# Patient Record
Sex: MALE | Race: OTHER | HISPANIC OR LATINO
[De-identification: names, ages, dates, MRNs, and addresses within clinical notes are randomized per-mention and may not be internally consistent; named-entity substitution may affect disease eponyms.]

---

## 2017-06-22 PROBLEM — Z00.00 ENCOUNTER FOR PREVENTIVE HEALTH EXAMINATION: Status: ACTIVE | Noted: 2017-06-22

## 2017-07-24 ENCOUNTER — APPOINTMENT (OUTPATIENT)
Dept: UROLOGY | Facility: CLINIC | Age: 77
End: 2017-07-24

## 2017-07-24 VITALS
BODY MASS INDEX: 26.98 KG/M2 | SYSTOLIC BLOOD PRESSURE: 162 MMHG | WEIGHT: 178 LBS | DIASTOLIC BLOOD PRESSURE: 70 MMHG | HEART RATE: 60 BPM | HEIGHT: 68 IN | RESPIRATION RATE: 16 BRPM

## 2017-07-24 DIAGNOSIS — Z86.69 PERSONAL HISTORY OF OTHER DISEASES OF THE NERVOUS SYSTEM AND SENSE ORGANS: ICD-10-CM

## 2017-07-24 DIAGNOSIS — Z86.79 PERSONAL HISTORY OF OTHER DISEASES OF THE CIRCULATORY SYSTEM: ICD-10-CM

## 2017-07-24 DIAGNOSIS — R10.2 PELVIC AND PERINEAL PAIN: ICD-10-CM

## 2017-07-24 DIAGNOSIS — N99.114 POSTPROCEDURAL URETHRAL STRICTURE, MALE, UNSPECIFIED: ICD-10-CM

## 2017-07-26 LAB
APPEARANCE: CLEAR
BACTERIA: NEGATIVE
BILIRUBIN URINE: NEGATIVE
BLOOD URINE: NEGATIVE
COLOR: YELLOW
GLUCOSE QUALITATIVE U: NORMAL MG/DL
KETONES URINE: NEGATIVE
LEUKOCYTE ESTERASE URINE: NEGATIVE
MICROSCOPIC-UA: NORMAL
NITRITE URINE: NEGATIVE
PH URINE: 5.5
PROTEIN URINE: NEGATIVE MG/DL
RED BLOOD CELLS URINE: 2 /HPF
SPECIFIC GRAVITY URINE: 1.01
SQUAMOUS EPITHELIAL CELLS: 0 /HPF
UROBILINOGEN URINE: NORMAL MG/DL
WHITE BLOOD CELLS URINE: 0 /HPF

## 2017-10-25 ENCOUNTER — APPOINTMENT (OUTPATIENT)
Dept: UROLOGY | Facility: CLINIC | Age: 77
End: 2017-10-25
Payer: MEDICARE

## 2017-10-25 DIAGNOSIS — C61 MALIGNANT NEOPLASM OF PROSTATE: ICD-10-CM

## 2017-10-25 PROCEDURE — 99214 OFFICE O/P EST MOD 30 MIN: CPT

## 2017-10-25 RX ORDER — GABAPENTIN 300 MG/1
300 CAPSULE ORAL
Qty: 120 | Refills: 2 | Status: ACTIVE | COMMUNITY
Start: 2017-10-25 | End: 1900-01-01

## 2017-10-25 RX ORDER — NORTRIPTYLINE HYDROCHLORIDE 10 MG/1
10 CAPSULE ORAL
Qty: 90 | Refills: 4 | Status: DISCONTINUED | COMMUNITY
Start: 2017-07-24 | End: 2017-10-25

## 2017-12-01 ENCOUNTER — APPOINTMENT (OUTPATIENT)
Dept: UROLOGY | Facility: CLINIC | Age: 77
End: 2017-12-01

## 2018-05-01 ENCOUNTER — TRANSCRIPTION ENCOUNTER (OUTPATIENT)
Age: 78
End: 2018-05-01

## 2022-04-08 ENCOUNTER — APPOINTMENT (OUTPATIENT)
Dept: ORTHOPEDIC SURGERY | Facility: CLINIC | Age: 82
End: 2022-04-08
Payer: MEDICARE

## 2022-04-08 VITALS — BODY MASS INDEX: 28.93 KG/M2 | HEIGHT: 66 IN | WEIGHT: 180 LBS

## 2022-04-08 DIAGNOSIS — Z78.9 OTHER SPECIFIED HEALTH STATUS: ICD-10-CM

## 2022-04-08 DIAGNOSIS — Z86.79 PERSONAL HISTORY OF OTHER DISEASES OF THE CIRCULATORY SYSTEM: ICD-10-CM

## 2022-04-08 DIAGNOSIS — Z87.891 PERSONAL HISTORY OF NICOTINE DEPENDENCE: ICD-10-CM

## 2022-04-08 PROBLEM — Z00.00 ENCOUNTER FOR PREVENTIVE HEALTH EXAMINATION: Noted: 2022-04-08

## 2022-04-08 PROCEDURE — 99214 OFFICE O/P EST MOD 30 MIN: CPT

## 2022-04-11 RX ORDER — ATORVASTATIN CALCIUM 10 MG/1
10 TABLET, FILM COATED ORAL
Qty: 30 | Refills: 0 | Status: ACTIVE | COMMUNITY
Start: 2022-03-04

## 2022-04-11 RX ORDER — HYDRALAZINE HYDROCHLORIDE 25 MG/1
25 TABLET ORAL
Qty: 90 | Refills: 0 | Status: ACTIVE | COMMUNITY
Start: 2022-03-29

## 2022-04-11 RX ORDER — ENALAPRIL MALEATE 20 MG/1
20 TABLET ORAL
Qty: 180 | Refills: 0 | Status: ACTIVE | COMMUNITY
Start: 2021-09-20

## 2022-04-11 RX ORDER — METOPROLOL SUCCINATE 50 MG/1
50 TABLET, EXTENDED RELEASE ORAL
Qty: 135 | Refills: 0 | Status: ACTIVE | COMMUNITY
Start: 2022-01-06

## 2022-04-11 RX ORDER — HYDRALAZINE HYDROCHLORIDE 10 MG/1
10 TABLET ORAL
Qty: 60 | Refills: 0 | Status: ACTIVE | COMMUNITY
Start: 2021-06-28

## 2022-04-11 RX ORDER — CHLORHEXIDINE GLUCONATE, 0.12% ORAL RINSE 1.2 MG/ML
0.12 SOLUTION DENTAL
Qty: 473 | Refills: 0 | Status: ACTIVE | COMMUNITY
Start: 2021-11-22

## 2022-04-11 RX ORDER — LATANOPROST/PF 0.005 %
0.01 DROPS OPHTHALMIC (EYE)
Qty: 2 | Refills: 0 | Status: ACTIVE | COMMUNITY
Start: 2022-03-17

## 2022-04-11 RX ORDER — HYDROCHLOROTHIAZIDE 12.5 MG/1
12.5 TABLET ORAL
Qty: 30 | Refills: 0 | Status: ACTIVE | COMMUNITY
Start: 2022-01-11

## 2022-04-11 NOTE — DISCUSSION/SUMMARY
[de-identified] : 82 y/o male with lumbar degenerative disc disease. Patient will continue lumbar home exercise program. I am advised the patient that if his pain doesn't improve or worsens with conservative care, than we can consider referring him to pain management for consideration of MBB's. I would like to follow up with the patient in 6 weeks to assess his response to conservative care. Will obtain MRI of lumbar spine to evaluate for nerve compression as etiology of symptoms. Discussed with patient that if refractory to conservative treatment decompressive surgery could be considered however will reserve that decision for after review of mri.

## 2022-04-11 NOTE — DATA REVIEWED
[Outside X-rays] : outside x-rays [Lumbar Spine] : lumbar spine [I independently reviewed and interpreted images and report] : I independently reviewed and interpreted images and report [I reviewed the films/CD and additionally noted] : I reviewed the films/CD and additionally noted [FreeTextEntry1] : I stop paperwork reviewed\par Urology progress notes reviewed

## 2022-04-11 NOTE — HISTORY OF PRESENT ILLNESS
[7] : 7 [Dull/Aching] : dull/aching [Constant] : constant [Sleep] : sleep [Retired] : Work status: retired [de-identified] : 82 y/o male presents for repeat evaluation of lower back. Patient reports a longstanding history of lower back pain. Patient reports that extension and twisting motions, such as playing golf exacerbates his pain. Patient has tried Lidocaine patches and Aleve without any relief. Patient states the pain is now in the groin and testes. [] : no [FreeTextEntry1] : L-spine  [FreeTextEntry7] : left hip, groin  [FreeTextEntry8] : lisbeth  [FreeTextEntry9] : Aleve  [de-identified] : PT, At home exrcises , Gym

## 2022-04-15 ENCOUNTER — RESULT REVIEW (OUTPATIENT)
Age: 82
End: 2022-04-15

## 2022-04-20 ENCOUNTER — APPOINTMENT (OUTPATIENT)
Dept: ORTHOPEDIC SURGERY | Facility: CLINIC | Age: 82
End: 2022-04-20
Payer: MEDICARE

## 2022-04-20 VITALS — WEIGHT: 180 LBS | BODY MASS INDEX: 28.93 KG/M2 | HEIGHT: 66 IN

## 2022-04-20 PROCEDURE — 99214 OFFICE O/P EST MOD 30 MIN: CPT

## 2022-04-20 NOTE — PHYSICAL EXAM
[de-identified] : Constitutional:\par -  General Appearance: \par Unremarkable\par Body Habitus\par Well Developed \par Well Nourished\par Body Habitus\par No Deformities\par Well Groomed\par \par Ability To communicate:\par Normal\par \par Neurologic: \par Global sensation is intact to upper and lower extremities.  See examination of Neck and/or Spine for exceptions.\par Orientation to Time, Place and Person is: Normal\par Mood And Affect is Normal\par \par Skin:\par -  Head/Face, Right Upper/Lower Extremity, Left Upper/Lower Extremity: Normal\par See Examination of Neck and/or Spine for exceptions\par \par Cardiovascular:\par Peripheral Cardiovascular System is Normal\par \par Palpation of Lymph Nodes:\par Normal Palpation of lymph nodes in: Axilla, Cervical, Inguinal\par Abnormal Palpation of lymph nodes in: None [de-identified] : Neurological Testing for the Lumbar spine is as follows:\par - Light Touch is intact throughout both Lower extremities\par - Achilles and patella reflexes are symmetrical\par - No sustained clonus at ankles\par - Negative Babinski test bilaterally\par - Sensory exam is non-focal throughout both lower extremities [] : non-antalgic

## 2022-04-20 NOTE — REVIEW OF SYSTEMS
[Joint Pain] : joint pain [Joint Stiffness] : joint stiffness [Negative] : Heme/Lymph [FreeTextEntry9] : L-SPINE

## 2022-04-20 NOTE — ASSESSMENT
[FreeTextEntry1] : 82 y/o male with lumbar facet arthropathy L4-S1 with correlative exam findings. I am referring the patient to pain management to discuss trying facet directed intervention - MBB/RFA. I would like to follow up with the patient on an as needed basis moving forward. \par \par Prior to appointment and during encounter with patient extensive medical records were reviewed including but not limited to, hospital records, out patient records, imaging results, and lab data. During this appointment the patient was examined, diagnoses were discussed and explained in a face to face manner. In addition extensive time was spent reviewing aforementioned diagnostic studies. Counseling including abnormal image results, differential diagnoses, treatment options, risk and benefits, lifestyle changes, current condition, and current medications was performed. Patient's comments, questions, and concerns were address and patient verbalized understanding. Based on this patient's presentation at our office, which is an orthopedic spine surgeon's office, this patient inherently / intrinsically has a risk, however minute, of developing issues such as Cauda equina syndrome, bowel and bladder changes, or progression of motor or neurological deficits such as paralysis which may be permanent.\par \par I, Chauncey Cesar, attest that this documentation has been prepared under the direction and in the presence of provider Star Jackson MD.

## 2022-04-20 NOTE — DATA REVIEWED
[MRI] : MRI [Lumbar Spine] : lumbar spine [I independently reviewed and interpreted images and report] : I independently reviewed and interpreted images and report [FreeTextEntry1] : Lumbar spine MRI taken (04/15/22) - [facility]\par On my interpretation of the images\par L5-S1 moderate facet arthritis bilaterally, broad based disc protrusion resulting in a moderate bilateral foraminal stenosis\par L4-5 disc degeneration, moderate, large anterior osteophyte, Bright signal but no erosive endplate changes \par L3-4 anterior osteophyte formation, moderate disc degeneration, mild to moderate facet degeneration, L3 anterior hemangioma  \par L2-3 mild to moderate disc degeneration, large anterior osteophyte, mild facet degeneration\par L1-2 mdoerate disc degeneration, large anterior osteophyte, no stenosis

## 2022-04-20 NOTE — HISTORY OF PRESENT ILLNESS
[6] : 6 [4] : 4 [Dull/Aching] : dull/aching [Radiating] : radiating [Tightness] : tightness [Squeezing] : squeezing [Constant] : constant [Sleep] : sleep [Retired] : Work status: retired [] : no [FreeTextEntry1] : L-spine  [FreeTextEntry5] : PATIENT IS HERE TODAY TO REVIEW MRI RESULTS - L-SPINE [FreeTextEntry7] : left hip, groin  [FreeTextEntry8] : lisbeth  [FreeTextEntry9] : Aleve; TYLENOL EXTRA STRENGTH [de-identified] : SWINGING GOLF CLUBS; CERTAI MOVEMENTS [de-identified] : 4/8/22 [de-identified] : DR BEE [de-identified] : 4/8/22 [de-identified] : MRI - 4/15/22 [de-identified] : PT, At home exErcises , Gym  [de-identified] : 80 y/o male presents for repeat evaluation of lower back. Patient reports a longstanding history of lower back pain. Patient reports that extension and twisting motions, such as playing golf exacerbates his pain. Patient has tried Lidocaine patches and Aleve without any relief. Patient states the pain is now in the groin and testes.

## 2022-05-12 ENCOUNTER — APPOINTMENT (OUTPATIENT)
Dept: PAIN MANAGEMENT | Facility: CLINIC | Age: 82
End: 2022-05-12

## 2022-05-24 ENCOUNTER — APPOINTMENT (OUTPATIENT)
Dept: PAIN MANAGEMENT | Facility: CLINIC | Age: 82
End: 2022-05-24
Payer: MEDICARE

## 2022-05-24 VITALS — BODY MASS INDEX: 28.93 KG/M2 | WEIGHT: 180 LBS | HEIGHT: 66 IN

## 2022-05-24 PROCEDURE — 99204 OFFICE O/P NEW MOD 45 MIN: CPT

## 2022-05-25 NOTE — HISTORY OF PRESENT ILLNESS
[Lower back] : lower back [6] : 6 [Dull/Aching] : dull/aching [Localized] : localized [Leisure] : leisure [Social interactions] : social interactions [Rest] : rest [Meds] : meds [FreeTextEntry1] : The patient presents for initial evaluation regarding their low back pain. Patient was referred by Dr. Jackson. Patient c/o low back pain radiating to the left lower extremity. Patient reports back > leg pain. Long standing history of back pain but recent worsening within the past 7-8 months. Patient reports he completed extensive PT with minimal to no benefit. Patient reports benefit with OTC Tylenol or Aleve. Patient reports he is unable to play golf and other daily activities for prolonged period of time due to severity of pain. He reports increase of pain in the morning but subsides somewhat throughout the day. Pain is dependant on activity. Patient denies weakness, paraesthesias and b/b dysfunction. \par  \par Subjective weakness: No \par Lower extremity paresthesias: No \par Bladder/bowel dysfunction: No \par  \par Injections: No \par  \par Pertinent Surgical History: N/A \par  \par Imaging: \par MRI Lumbar Spine (04/15/22) -  [facility]\par  \par T12-L1: There is no disc herniation, significant central canal or right foraminal stenosis. There is left facet arthropathy minimally encroach upon the left neural foramen.\par L1-2: There is no disc herniation, significant central canal or neural foraminal stenosis.\par L2-3: There is disc desiccation and disc bulge with slight retrolisthesis. There is a small superimposed central disc protrusion impinging upon the thecal sac. There is mild bilateral facet arthropathy and minimal spinal stenosis.\par L3-4: There is disc desiccation and mild disc bulge with broad-based left lateral and foraminal disc herniation impinging upon the left L3 nerve root. There is no significant central canal stenosis.\par L4-5: There is endplate osteophyte and disc bulge encroaching upon the neural foramina and L4 nerve roots bilaterally. There is bilateral facet arthropathy without significant central canal stenosis.\par L5-S1: There is disc desiccation compatible disc degeneration. There is moderate disc bulge, with a small superimposed central disc herniation, and Modic type II endplate change. There is bilateral facet arthropathy contributing to bilateral foraminal encroachment. There is no significant central canal stenosis.\par  \par Physician Disclaimer: I have personally reviewed and confirmed all HPI data with the patient. [] : no [de-identified] : lumbar mri at [facility] dio

## 2022-05-25 NOTE — PHYSICAL EXAM
[de-identified] : Constitutional: \par - No acute distress \par - Well developed; well nourished \par \par Neurological: \par - normal mood and affect \par - alert and oriented x 3  \par \par Cardiovascular: \par - 1+ BLE edema \par \par Lumbar Spine Exam: \par \par Inspection: \par erythema (-) \par ecchymosis (-) \par rashes (-) \par alignment: no scoliosis\par \par Palpation:  \par paraspinal tenderness:                  L (-) ; R (-) \par thoracic paraspinal tenderness:    L (-) ; R (-) \par sciatic nerve tenderness :             L (-) ; R (-) \par SI joint tenderness:                        L (-) ; R (-) \par GTB tenderness:                            L (-);  R (-) \par \par ROM:   \par Full ROM with mild stiffness \par Pain with extension \par \par Strength:                   Right       Left    \par Hip Flexion:                (5/5)       (5/5) \par Quadriceps:               (5/5)       (5/5) \par Hamstrings:                (5/5)       (5/5) \par Ankle Dorsiflexion:     (5/5)       (5/5) \par EHL:                            (5/5)       (5/5) \par Ankle Plantarflexion:  (5/5)       (5/5) \par \par \par Special Tests: \par SLR:                      R (-) ; L (-) \par Facet loading:       R (+) ; L (+) \par SHELTON test:          R (-) ; L (-) \par XSLR:                   R (-) ; L (-) \par Jarrett's test:        R (-) ; L(-) \par Tight Hamstrings   R (-) ; L (-) \par \par Neurologic: \par Light touch intact throughout LE \par Reflexes normal and symmetric \par \par Gait: \par non- antalgic gait \par ambulates without assistive device

## 2022-05-25 NOTE — ASSESSMENT
[FreeTextEntry1] : A discussion regarding available pain management treatment options occurred with the patient.  These included interventional, rehabilitative, pharmacological, and alternative modalities. We will proceed with the following: \par \par Interventional treatment options: \par - Proceed with B/L L4-5, L5-S1 facet joint MBB with fluoroscopic guidance; proceed to RFA if adequate response\par - Patient requires clearance to hold Plavix x 7 days \par - Patient may remain on aspirin for indicated procedure \par - see additional instructions below \par \par Rehabilitative options: \par - completed trial of physical therapy \par - participation in active HEP was encouraged\par \par Medication based treatment options: \par - poor candidate for oral NSAIDs secondary to anti-coagulation \par - continue Tylenol 500-1000mg TID PRN \par - see additional instructions below \par \par Complementary treatment options: \par - Weight management and lifestyle modifications discussed\par \par Additional treatment recommendations as follows: \par - Follow up 1-2 weeks post injection for assessment of efficacy and further recommendations.\par \par Patient presents with axial lumbar pain that has not responded to 3 months of conservative therapy including physical therapy or NSAID therapy.  The pain is interfering with activities of daily living and functionality.  There is no radicular pain.  The pain is exacerbated by facet loading.  Positive Kemps maneuver which is defined by pain reproduction with extension and rotation of the lumbar spine to the affected side.  The patient has not had a vertebral fusion at the levels of the proposed treatment.  There is no unexplained neurologic deficit.  There is no history of systemic infection, unstable medical condition, bleeding tendency, or local infection.  The injection is being performed to diagnose the facet joint as the source of the individual's pain.\par \par The documentation recorded by the scribe, in my presence, accurately reflects the service I personally performed and the decisions made by me with my edits as appropriate.\par \par I, Brenda Bailey acting as scribe, attest that this documentation has been prepared under the direction and in the presence of Provider Kurt Clinton DO.

## 2022-06-08 ENCOUNTER — NON-APPOINTMENT (OUTPATIENT)
Age: 82
End: 2022-06-08

## 2022-06-09 ENCOUNTER — TRANSCRIPTION ENCOUNTER (OUTPATIENT)
Age: 82
End: 2022-06-09

## 2022-06-10 ENCOUNTER — APPOINTMENT (OUTPATIENT)
Dept: DISASTER EMERGENCY | Facility: HOSPITAL | Age: 82
End: 2022-06-10

## 2022-06-10 ENCOUNTER — OUTPATIENT (OUTPATIENT)
Dept: OUTPATIENT SERVICES | Facility: HOSPITAL | Age: 82
LOS: 1 days | End: 2022-06-10

## 2022-06-10 VITALS
DIASTOLIC BLOOD PRESSURE: 69 MMHG | OXYGEN SATURATION: 98 % | SYSTOLIC BLOOD PRESSURE: 190 MMHG | RESPIRATION RATE: 16 BRPM | HEIGHT: 66 IN | TEMPERATURE: 98 F | WEIGHT: 178.57 LBS | HEART RATE: 72 BPM

## 2022-06-10 VITALS
DIASTOLIC BLOOD PRESSURE: 63 MMHG | RESPIRATION RATE: 16 BRPM | HEART RATE: 55 BPM | TEMPERATURE: 99 F | OXYGEN SATURATION: 97 % | SYSTOLIC BLOOD PRESSURE: 143 MMHG

## 2022-06-10 DIAGNOSIS — U07.1 COVID-19: ICD-10-CM

## 2022-06-10 RX ORDER — BEBTELOVIMAB 87.5 MG/ML
175 INJECTION, SOLUTION INTRAVENOUS ONCE
Refills: 0 | Status: COMPLETED | OUTPATIENT
Start: 2022-06-10 | End: 2022-06-10

## 2022-06-10 RX ADMIN — BEBTELOVIMAB 175 MILLIGRAM(S): 87.5 INJECTION, SOLUTION INTRAVENOUS at 11:24

## 2022-06-10 NOTE — MONOCLONAL ANTIBODY INFUSION - EXAM
CC: Monoclonal Antibody Infusion/COVID 19 Positive  81yMale with CAD, HTN, pre-DM    exam/findings:  T(C): 36.7 (06-10-22 @ 11:08), Max: 36.7 (06-10-22 @ 11:08)  HR: 72 (06-10-22 @ 11:08) (72 - 72)  BP: --  RR: 16 (06-10-22 @ 11:08) (16 - 16)  SpO2: 98% (06-10-22 @ 11:08) (98% - 98%)      PE:   Appearance: NAD	  HEENT:   Normal oral mucosa,   Lymphatic: No lymphadenopathy  Cardiovascular: Normal S1 S2, No JVD, No murmurs, No edema  Respiratory: Lungs clear to auscultation	  Gastrointestinal:  Soft, Non-tender, + BS	  Skin: warm and dry  Neurologic: Non-focal  Extremities: Normal range of motion

## 2022-06-10 NOTE — MONOCLONAL ANTIBODY INFUSION - HOME MEDICATIONS
atorvastatin ,  orally   aspirin 81 mg oral tablet , 1 tab(s) orally once a day  Plavix 75 mg oral tablet , 1 tab(s) orally once a day  metoprolol ,  orally   Norvasc 10 mg oral tablet , 1 tab(s) orally once a day  Vasotec 20 mg oral tablet , 1 tab(s) orally once a day

## 2022-06-10 NOTE — MONOCLONAL ANTIBODY INFUSION - ASSESSMENT AND PLAN
ASSESSMENT:  Pt is a 81 years with -Covid + on 6/8, onset on 6/7 referred by Dr. Marshall .who presents to infusion center for Monoclonal antibody infusion (Bebtelovimab). Patient is vaccinated and boosted x 2 with Pfizer  Symptoms/ Criteria: sore throat, fever  Risk Profile includes: CAD, HTN, pre-DM    PLAN:  - infusion procedure explained to patient   - Consent for monoclonal antibody infusion obtained   - Risk & benefits discussed/all questions answered  - Bebtelovimab 175mg IVP over 30 seconds  - observe patient for one hour post infusion and discharge home

## 2022-09-14 ENCOUNTER — APPOINTMENT (OUTPATIENT)
Dept: ORTHOPEDIC SURGERY | Facility: CLINIC | Age: 82
End: 2022-09-14

## 2022-09-14 VITALS — BODY MASS INDEX: 28.93 KG/M2 | WEIGHT: 180 LBS | HEIGHT: 66 IN

## 2022-09-14 PROCEDURE — 20611 DRAIN/INJ JOINT/BURSA W/US: CPT

## 2022-09-14 PROCEDURE — 73564 X-RAY EXAM KNEE 4 OR MORE: CPT | Mod: LT

## 2022-09-14 PROCEDURE — 99214 OFFICE O/P EST MOD 30 MIN: CPT | Mod: 25

## 2022-09-14 NOTE — DISCUSSION/SUMMARY
[de-identified] : RB&A to corticosteroid injection discussed.\par All questions were answered.\par Patient wishes to move forward with injection today. \par Aspirated 20ml clear synovial fluid using ultrasound \par Cortisone Knee Injection - Left\par The risks, benefits, and alternatives to cortisone injection were explained in full to the patient.  Risks outlined include but are not limited to infection, sepsis, bleeding, scarring, skin discoloration, temporary increase in pain, syncopal episode, failure to resolve symptoms, allergic reaction, symptom recurrence, and elevation of blood sugar in diabetics.  Patient understood the risks.  All questions were answered.  After discussion of options, patient requested an injection.  Oral informed consent was obtained and sterile prep was done of the injection site.  A mixture of 40mg of Kenalog, 2cc of 1% Lidocaine was sterilely prepared by me.  Sterile technique was used to introduce the mixture into the left knee. Ultrasound was used for proper needle placement.  Patient tolerated the procedure well.  Advised to ice the injection site this evening. \par \par Patient will follow up in 6 weeks \par \par -----------------------------------------------\par Home Exercise\par The patient is instructed on a home exercise program.\par \par CORTEZ GARCIA Acting as a Scribe for Dr. eHlton\par I, Cortez Garcia, attest that this documentation has been prepared under the direction and in the presence of Provider Phi Helton MD.\par \par Activity Modification\par The patient was advised to modify their activities.\par \par Dx / Natural History\par The patient was advised of the diagnosis.  The natural history of the pathology was explained in full to the patient in layman's terms.  Several different treatment options were discussed and explained in full to the patient including the risks and benefits of both surgical and non-surgical treatments.  All questions and concerns were answered.\par \par Pain Guide Activities\par The patient was advised to let pain guide the gradual advancement of activities.\par \par RICE\par I explained to the patient that rest, ice, compression, and elevation would benefit them.  They may return to activity after follow-up or when they no longer have any pain.

## 2022-09-14 NOTE — HISTORY OF PRESENT ILLNESS
[de-identified] : The patient is a 81 year old L hand dominant male who presents today complaining of L knee pain.  \par Date of Injury/Onset: ~08/2022\par Pain:    At Rest: 6/10 \par With Activity:  6/10 \par Mechanism of injury: Insidious \par This is NOT a Work Related Injury being treated under Worker's Compensation.\par This is NOT an athletic injury occurring associated with an interscholastic or organized sports team.\par Quality of symptoms: Pressure, discomfort\par Improves with: N/A\par Worse with: Golfing \par Prior treatment: Compression, patellar strap, ice\par Prior Imaging: N/A\par Out of work/sport: _, since _\par School/Sport/Position/Occupation: Retired\par Additional Information: PMHx of non-op quadriceps tear on involved side. \par

## 2022-09-14 NOTE — PHYSICAL EXAM
[de-identified] : Neurologic: normal coordination, normal DTR UE/LE , normal sensation, normal mood and affect, orientated and able to communicate. \par Skin: normal skin, no rash, no ulcers and no lesions. \par Lymphatic: no obvious lymphadenopathy in areas examined. \par Constitutional: well developed and well nourished. \par Cardiovascular: peripheral vascular exam is grossly normal. \par Pulmonary: no respiratory distress, lungs clear to auscultation bilaterally. \par Abdomen: normal bowel sounds, non-tender, no HSM and no mass. \par \par Left Knee: Moderate effusion\par Medial joint line tenderness\par Medial facet of patella tenderness\par Positive Elena's test \par Complete tear to lateral aspect of quadriceps tendon noted\par \par X-Ray Left Knee: 4 views: Patellar bone spurs, PF OA

## 2022-11-28 ENCOUNTER — APPOINTMENT (OUTPATIENT)
Dept: OTOLARYNGOLOGY | Facility: CLINIC | Age: 82
End: 2022-11-28

## 2023-08-15 ENCOUNTER — APPOINTMENT (OUTPATIENT)
Dept: ORTHOPEDIC SURGERY | Facility: CLINIC | Age: 83
End: 2023-08-15
Payer: MEDICARE

## 2023-08-15 VITALS — WEIGHT: 180 LBS | BODY MASS INDEX: 28.93 KG/M2 | HEIGHT: 66 IN

## 2023-08-15 PROCEDURE — 99214 OFFICE O/P EST MOD 30 MIN: CPT

## 2023-08-15 NOTE — DISCUSSION/SUMMARY
[de-identified] : Physical therapy prescribed for strengthening and stretching for lumbar spine. Folloe up with spine services.   ----------------------------------------------- Home Exercise The patient is instructed on a home exercise program.  RAMBO DUEÑAS Acting as a Scribe for Dr. Sunitha WORRELL, Rambo Dueñas, attest that this documentation has been prepared under the direction and in the presence of Provider Phi Helton MD.  Activity Modification The patient was advised to modify their activities.  Dx / Natural History The patient was advised of the diagnosis.  The natural history of the pathology was explained in full to the patient in layman's terms.  Several different treatment options were discussed and explained in full to the patient including the risks and benefits of both surgical and non-surgical treatments.  All questions and concerns were answered.  Pain Guide Activities The patient was advised to let pain guide the gradual advancement of activities.  VEL WORRELL explained to the patient that rest, ice, compression, and elevation would benefit them.  They may return to activity after follow-up or when they no longer have any pain.  The patient's current medication management of their orthopedic diagnosis was reviewed today: (1) We discussed a comprehensive treatment plan that included possible pharmaceutical management involving the use of prescription strength medications including but not limited to options such as oral Naprosyn 500mg BID, once daily Meloxicam 15 mg, or 500-650 mg Tylenol versus over the counter oral medications and topical prescription NSAID Pennsaid vs over the counter Voltaren gel. (2) There is a moderate risk of morbidity with further treatment, especially from use of prescription strength medications and possible side effects of these medications which include upset stomach with oral medications, skin reactions to topical medications and cardiac/renal issues with long term use. (3) I recommended that the patient follow-up with their medical physician to discuss any significant specific potential issues with long term medication use such as interactions with current medications or with exacerbation of underlying medical comorbidities. (4) The benefits and risks associated with use of injectable, oral or topical, prescription and over the counter anti-inflammatory medications were discussed with the patient. The patient voiced understanding of the risks including but not limited to bleeding, stroke, kidney dysfunction, heart disease, and were referred to the black box warning label for further information.

## 2023-08-15 NOTE — PHYSICAL EXAM
[de-identified] : Left Knee:  No effusion Medial joint line tenderness Medial facet of patella tenderness Positive Elena's test  Complete tear to lateral aspect of quadriceps tendon noted  Lumbar Spine: sciatic tenderness    X-Ray Left Knee: 4 views: Patellar bone spurs, PF OA

## 2023-08-15 NOTE — HISTORY OF PRESENT ILLNESS
[de-identified] : The patient is a 82 year old [LEFT] hand dominant male who presents today complaining of left knee.   Date of Injury/Onset: 8/2022 Pain:    At Rest: 7/10  With Activity:  7/10  Mechanism of injury: insidious  This is NOT a Work Related Injury being treated under Worker's Compensation. This is NOT an athletic injury occurring associated with an interscholastic or organized sports team. Quality of symptoms: aching  Improves with: CSI, stretching  Worse with: activity, bending  Treatment/Imaging/Studies Since Last Visit: CSI and ASP 9/14/22, which helped for about 5 months 	Reports Available For Review Today: n/a Out of work/sport: _, since _ School/Sport/Position/Occupation:retired   Change since last visit: new onset radiating pain down the posterior left hip- has been followed by [facility]ren Additional Information:  hx of Patellar bone spurs, PF OA

## 2023-08-28 ENCOUNTER — APPOINTMENT (OUTPATIENT)
Dept: ORTHOPEDIC SURGERY | Facility: CLINIC | Age: 83
End: 2023-08-28
Payer: MEDICARE

## 2023-08-28 VITALS — BODY MASS INDEX: 28.93 KG/M2 | HEIGHT: 66 IN | WEIGHT: 180 LBS

## 2023-08-28 DIAGNOSIS — M54.16 RADICULOPATHY, LUMBAR REGION: ICD-10-CM

## 2023-08-28 PROCEDURE — 99214 OFFICE O/P EST MOD 30 MIN: CPT

## 2023-08-28 PROCEDURE — 72100 X-RAY EXAM L-S SPINE 2/3 VWS: CPT

## 2023-08-28 PROCEDURE — 99204 OFFICE O/P NEW MOD 45 MIN: CPT

## 2023-08-30 NOTE — ASSESSMENT
[FreeTextEntry1] : 83 y/o male with multi-level facet arthritis L4-S1 and stenosis from revealed from CT scan > 1 yr ago. XRay taken today appears relatively unchanged since last imaging a year ago. There are no neurologic abnormalities upon examination. He will initiate physical therapy prescribed by Dr. Helton scheduled for next week. If he remains unimproved from physical therapy, will order updated MRI / refer to PM for possible interventional injections. FUV 6 WKS.   Prior to appointment and during encounter with patient extensive medical records were reviewed including but not limited to, hospital records, out patient records, imaging results, and lab data. During this appointment the patient was examined, diagnoses were discussed and explained in a face to face manner. In addition extensive time was spent reviewing aforementioned diagnostic studies. Counseling including abnormal image results, differential diagnoses, treatment options, risk and benefits, lifestyle changes, current condition, and current medications was performed. Patient's comments, questions, and concerns were address and patient verbalized understanding. Based on this patient's presentation at our office, which is an orthopedic spine surgeon's office, this patient inherently / intrinsically has a risk, however minute, of developing issues such as Cauda equina syndrome, bowel and bladder changes, or progression of motor or neurological deficits such as paralysis which may be permanent.  I, Dominga Ervin, attest that this documentation has been prepared under the direction and in the presence of provider Star Jackson MD.

## 2023-08-30 NOTE — DATA REVIEWED
[FreeTextEntry1] : On my interpretation of these images in office x-rays Lumbar spine ap/lat demonstrates advanced facet arthropathy

## 2023-08-30 NOTE — HISTORY OF PRESENT ILLNESS
[de-identified] : 08/28/2023 - 83 y/o M presenting for re-evulation of lumbar spine, last seen 1.5 yr ago. Today there is chief complaint of pain radiating into the posterior left thigh. He followed up with Dr. Helton a few weeks ago due to left knee pain and was referred to PT. He is scheduled for physical therapy starting next week. He denies any right sided lower extremity symptoms or change in lower extremity strength.  [FreeTextEntry5] : 08/28/2023 - The patient is a 82 year old male who presents today complaining of lumbar spine pain Date of Injury/Onset:  1 month ago Pain:    At Rest:   4/10 With Activity:    8/10 Mechanism of injury:  onset Quality of symptoms:  dull ache radiating down left leg  Improves with:  heat, movement Worse with:  out on [place], prolonged standing Prior treatment:  Dr Helton 9/14/22 Prior Imaging:   no Additional Information:  last seen 4/20/22

## 2023-08-30 NOTE — PHYSICAL EXAM
[de-identified] : Constitutional:\par  -  General Appearance: \par  Unremarkable\par  Body Habitus\par  Well Developed \par  Well Nourished\par  Body Habitus\par  No Deformities\par  Well Groomed\par  \par  Ability To communicate:\par  Normal\par  \par  Neurologic: \par  Global sensation is intact to upper and lower extremities.  See examination of Neck and/or Spine for exceptions.\par  Orientation to Time, Place and Person is: Normal\par  Mood And Affect is Normal\par  \par  Skin:\par  -  Head/Face, Right Upper/Lower Extremity, Left Upper/Lower Extremity: Normal\par  See Examination of Neck and/or Spine for exceptions\par  \par  Cardiovascular:\par  Peripheral Cardiovascular System is Normal\par  \par  Palpation of Lymph Nodes:\par  Normal Palpation of lymph nodes in: Axilla, Cervical, Inguinal\par  Abnormal Palpation of lymph nodes in: None [] : non-antalgic [de-identified] : Neurological Testing for the Lumbar spine is as follows: - Light Touch is intact throughout both Lower extremities - Achilles and patella reflexes are symmetrical - No sustained clonus at ankles - Negative Babinski test bilaterally - Sensory exam is non-focal throughout both lower extremities

## 2023-08-30 NOTE — PHYSICAL EXAM
[de-identified] : Constitutional:\par  -  General Appearance: \par  Unremarkable\par  Body Habitus\par  Well Developed \par  Well Nourished\par  Body Habitus\par  No Deformities\par  Well Groomed\par  \par  Ability To communicate:\par  Normal\par  \par  Neurologic: \par  Global sensation is intact to upper and lower extremities.  See examination of Neck and/or Spine for exceptions.\par  Orientation to Time, Place and Person is: Normal\par  Mood And Affect is Normal\par  \par  Skin:\par  -  Head/Face, Right Upper/Lower Extremity, Left Upper/Lower Extremity: Normal\par  See Examination of Neck and/or Spine for exceptions\par  \par  Cardiovascular:\par  Peripheral Cardiovascular System is Normal\par  \par  Palpation of Lymph Nodes:\par  Normal Palpation of lymph nodes in: Axilla, Cervical, Inguinal\par  Abnormal Palpation of lymph nodes in: None [] : non-antalgic [de-identified] : Neurological Testing for the Lumbar spine is as follows: - Light Touch is intact throughout both Lower extremities - Achilles and patella reflexes are symmetrical - No sustained clonus at ankles - Negative Babinski test bilaterally - Sensory exam is non-focal throughout both lower extremities

## 2023-08-30 NOTE — HISTORY OF PRESENT ILLNESS
[de-identified] : 08/28/2023 - 83 y/o M presenting for re-evulation of lumbar spine, last seen 1.5 yr ago. Today there is chief complaint of pain radiating into the posterior left thigh. He followed up with Dr. Helton a few weeks ago due to left knee pain and was referred to PT. He is scheduled for physical therapy starting next week. He denies any right sided lower extremity symptoms or change in lower extremity strength.  [FreeTextEntry5] : 08/28/2023 - The patient is a 82 year old male who presents today complaining of lumbar spine pain Date of Injury/Onset:  1 month ago Pain:    At Rest:   4/10 With Activity:    8/10 Mechanism of injury:  onset Quality of symptoms:  dull ache radiating down left leg  Improves with:  heat, movement Worse with:  out on [place], prolonged standing Prior treatment:  Dr Helton 9/14/22 Prior Imaging:   no Additional Information:  last seen 4/20/22

## 2023-10-11 ENCOUNTER — APPOINTMENT (OUTPATIENT)
Dept: ORTHOPEDIC SURGERY | Facility: CLINIC | Age: 83
End: 2023-10-11
Payer: MEDICARE

## 2023-10-11 VITALS — HEIGHT: 66 IN | WEIGHT: 180 LBS | BODY MASS INDEX: 28.93 KG/M2

## 2023-10-11 DIAGNOSIS — M48.061 SPINAL STENOSIS, LUMBAR REGION WITHOUT NEUROGENIC CLAUDICATION: ICD-10-CM

## 2023-10-11 DIAGNOSIS — M47.816 SPONDYLOSIS W/OUT MYELOPATHY OR RADICULOPATHY, LUMBAR REGION: ICD-10-CM

## 2023-10-11 DIAGNOSIS — M51.36 OTHER INTERVERTEBRAL DISC DEGENERATION, LUMBAR REGION: ICD-10-CM

## 2023-10-11 PROCEDURE — 99213 OFFICE O/P EST LOW 20 MIN: CPT

## 2023-11-26 ENCOUNTER — EMERGENCY (EMERGENCY)
Facility: HOSPITAL | Age: 83
LOS: 1 days | Discharge: HOME | End: 2023-11-26
Attending: EMERGENCY MEDICINE
Payer: COMMERCIAL

## 2023-11-26 VITALS
HEIGHT: 66 IN | WEIGHT: 176.37 LBS | HEART RATE: 61 BPM | TEMPERATURE: 98 F | DIASTOLIC BLOOD PRESSURE: 62 MMHG | SYSTOLIC BLOOD PRESSURE: 175 MMHG | RESPIRATION RATE: 18 BRPM | OXYGEN SATURATION: 98 %

## 2023-11-26 PROCEDURE — 99283 EMERGENCY DEPT VISIT LOW MDM: CPT

## 2023-11-26 PROCEDURE — 99284 EMERGENCY DEPT VISIT MOD MDM: CPT

## 2023-11-26 PROCEDURE — 73502 X-RAY EXAM HIP UNI 2-3 VIEWS: CPT

## 2023-11-26 PROCEDURE — 73502 X-RAY EXAM HIP UNI 2-3 VIEWS: CPT | Mod: 26,LT

## 2023-11-26 RX ORDER — METHOCARBAMOL 500 MG/1
2 TABLET, FILM COATED ORAL
Qty: 6 | Refills: 0
Start: 2023-11-26 | End: 2023-11-28

## 2023-11-26 RX ORDER — IBUPROFEN 200 MG
1 TABLET ORAL
Qty: 9 | Refills: 0
Start: 2023-11-26 | End: 2023-11-28

## 2023-11-26 RX ORDER — CYCLOBENZAPRINE HYDROCHLORIDE 10 MG/1
10 TABLET, FILM COATED ORAL ONCE
Refills: 0 | Status: COMPLETED | OUTPATIENT
Start: 2023-11-26 | End: 2023-11-26

## 2023-11-26 RX ORDER — ACETAMINOPHEN 500 MG
650 TABLET ORAL ONCE
Refills: 0 | Status: COMPLETED | OUTPATIENT
Start: 2023-11-26 | End: 2023-11-26

## 2023-11-26 RX ORDER — LIDOCAINE 4 G/100G
1 CREAM TOPICAL ONCE
Refills: 0 | Status: COMPLETED | OUTPATIENT
Start: 2023-11-26 | End: 2023-11-26
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
## 2023-11-26 NOTE — ED PROVIDER NOTE - PATIENT PORTAL LINK FT
You can access the FollowMyHealth Patient Portal offered by [facility] by registering at the following website: [email or web address]. By joining [facility]’s FollowMyHealth portal, you will also be able to view your health information using other applications (apps) compatible with our system.

## 2023-11-26 NOTE — ED PROVIDER NOTE - NSFOLLOWUPINSTRUCTIONS_ED_ALL_ED_FT
Hip Pain    WHAT YOU NEED TO KNOW:    What causes hip pain? Hip pain can be caused by a number of conditions, such as bursitis, arthritis, or muscle or tendon strain. You may have swelling in the fluid-filled sacs that protect your muscles and tendons. Hip pain can also be caused by a lower back problem. Hip pain may be caused by trauma, playing sports, or running. Pain may start in your hip and go to your thigh, buttock, or groin.  Hip and Pelvis    How can I manage hip pain? You may need x-rays to make sure there are no broken bones that need to be treated.    Rest your injured hip so that it can heal. You may need to avoid putting any weight on your hip for at least 48 hours. Return to normal activities as directed.    Ice the injury for 20 minutes every 4 hours, or as directed. Use an ice pack, or put crushed ice in a plastic bag. Cover it with a towel to protect your skin. Ice helps prevent tissue damage and decreases swelling and pain.    Elevate your injured hip above the level of your heart as often as you can. This will help decrease swelling and pain. If possible, prop your hip and leg on pillows or blankets to keep the area elevated comfortably.    NSAIDs, such as ibuprofen, help decrease swelling, pain, and fever. This medicine is available with or without a doctor's order. NSAIDs can cause stomach bleeding or kidney problems in certain people. If you take blood thinner medicine, always ask your healthcare provider if NSAIDs are safe for you. Always read the medicine label and follow directions.    Maintain a healthy weight. Extra body weight can cause pressure and pain in your hip, knee, and ankle joints. Ask your healthcare provider how much you should weigh. Ask him or her to help you create a weight loss plan if you are overweight.    Use assistive devices as directed. You may need to use a cane or crutches. Assistive devices help decrease pain and pressure on your hip when you walk. Ask your healthcare provider for more information about assistive devices and how to use them correctly.  When should I seek immediate care?    Your pain gets worse.    You have numbness in your leg or toes.    You cannot put any weight on or move your hip.  When should I contact my healthcare provider?    You have a fever.    Your pain does not decrease, even after treatment.    You have questions or concerns about your condition or care.  CARE AGREEMENT:    You have the right to help plan your care. Learn about your health condition and how it may be treated. Discuss treatment options with your healthcare providers to decide what care you want to receive. You always have the right to refuse treatment.

## 2023-11-26 NOTE — ED PROVIDER NOTE - PHYSICAL EXAMINATION
Constitutional: Well appearing, awake, alert, oriented to person, place, and time/situation and in no apparent distress  ENMT: Airway patent nasal mucosa clear. Mouth with normal mucosa. Throat has no vesicles no oropharyngeal exudates and uvula is midline. No blood in the oropharynx  EYES: clear bilaterally, pupils equal, round and reactive to light  Cardiac: Regular rate, regular rhythm. Heart sounds S1, S2. No murmurs, rubs or gallops. Good capillary refill, 2+ pulses, no peripheral edema  Respiratory: Lungs CTAB, no use of accessory muscles, no crackles, satting 99% on RA in no distress  Gastrointestinal: Abdomen nondistended, non-tender, no rebound guarding or peritoneal signs  Genitourinary: No CVA tenderness, pelvis nontender, bladder nondistended  Musculoskeletal: Spine appears normal, range of motion is not limited, no muscle or joint tenderness. L hip mildly ttp full ROM sensation reflexes intact 2+ pulses capillary refill < 2 seconds, no obvious deformities, ambulating on his own   Neurological: Alert and oriented, no focal deficits, no motor or sensory deficits. CN 2-12 intact, PERRLA, EOMI, No FND, moving all extremities equally

## 2024-04-24 ENCOUNTER — APPOINTMENT (OUTPATIENT)
Dept: ORTHOPEDIC SURGERY | Facility: CLINIC | Age: 84
End: 2024-04-24
Payer: MEDICARE

## 2024-04-24 VITALS — HEIGHT: 66 IN | WEIGHT: 180 LBS | BODY MASS INDEX: 28.93 KG/M2

## 2024-04-24 DIAGNOSIS — S89.92XD UNSPECIFIED INJURY OF LEFT LOWER LEG, SUBSEQUENT ENCOUNTER: ICD-10-CM

## 2024-04-24 DIAGNOSIS — M79.18 MYALGIA, OTHER SITE: ICD-10-CM

## 2024-04-24 DIAGNOSIS — M25.562 PAIN IN LEFT KNEE: ICD-10-CM

## 2024-04-24 PROCEDURE — 99214 OFFICE O/P EST MOD 30 MIN: CPT | Mod: 25

## 2024-04-24 PROCEDURE — 20611 DRAIN/INJ JOINT/BURSA W/US: CPT | Mod: LT

## 2024-04-24 PROCEDURE — 73564 X-RAY EXAM KNEE 4 OR MORE: CPT | Mod: LT

## 2024-04-24 NOTE — DISCUSSION/SUMMARY
[de-identified] : Reviewed all X Ray images with patient today and interpretation was provided. Discussed treatment options, the patient would like to follow through with CSI injection. Patient will follow up as needed.   RB&A to corticosteroid injection discussed. All questions were answered. Patient wishes to move forward with injection today.    Left Knee Ultrasound Guided aspiration/steroid injection procedure note: Patient Identification Name/: Verbal with patient and/or family   Procedure Verification: Procedure confirmed with patient or family/designee Consent for procedure: Verbal Consent Given Relevant documentation completed, reviewed, and signed Clinical indications for procedure confirmed  Time-out with all members of procedure team immediately prior to procedure: Correct patient identified. Agreement on procedure. Correct side and site.  KNEE INTRAARTICULAR INJECTION - LEFT After verbal consent and identification of the correct patient and correct site, the LEFT superolateral knee was prepped using alcohol swabs and betadine. This was allowed time to air dry.  A mixture of Kenalog,  Bupicacaine  was injected into the left knee using a sterile 22G 1.5 inch needle.  The patient tolerated the procedure well.  A sterile dressing was placed.  After-care instructions were provided and included instructions to ice the area and to call if redness, pain, or fever develop.  ----------------------------------------------- Home Exercise The patient is instructed on a home exercise program.   RAMBO DUEÑAS Acting as a Scribe for Dr. Sunitha WORRELL, Rambo Dueñas, attest that this documentation has been prepared under the direction and in the presence of Provider Dr. Helton.   Activity Modification The patient was advised to modify their activities.   Dx / Natural History The patient was advised of the diagnosis.  The natural history of the pathology was explained in full to the patient in layman's terms.  Several different treatment options were discussed and explained in full to the patient including the risks and benefits of both surgical and non-surgical treatments.  All questions and concerns were answered.   Pain Guide Activities The patient was advised to let pain guide the gradual advancement of activities.   VEL WORRELL explained to the patient that rest, ice, compression, and elevation would benefit them.  They may return to activity after follow-up or when they no longer have any pain.   The patient's current medication management of their orthopedic diagnosis was reviewed today: (1) We discussed a comprehensive treatment plan that included possible pharmaceutical management involving the use of prescription strength medications including but not limited to options such as oral Naprosyn 500mg BID, once daily Meloxicam 15 mg, or 500-650 mg Tylenol versus over the counter oral medications and topical prescription NSAID Pennsaid vs over the counter Voltaren gel. (2) There is a moderate risk of morbidity with further treatment, especially from use of prescription strength medications and possible side effects of these medications which include upset stomach with oral medications, skin reactions to topical medications and cardiac/renal issues with long term use. (3) I recommended that the patient follow-up with their medical physician to discuss any significant specific potential issues with long term medication use such as interactions with current medications or with exacerbation of underlying medical comorbidities. (4) The benefits and risks associated with use of injectable, oral or topical, prescription and over the counter anti-inflammatory medications were discussed with the patient. The patient voiced understanding of the risks including but not limited to bleeding, stroke, kidney dysfunction, heart disease, and were referred to the black box warning label for further information.

## 2024-04-24 NOTE — PHYSICAL EXAM
[de-identified] : Neurovascularly intact distally   Left Knee:  No effusion Medial joint line tenderness Medial facet of patella tenderness Positive Elena's test Complete tear to lateral aspect of quadriceps tendon noted

## 2024-04-24 NOTE — HISTORY OF PRESENT ILLNESS
[de-identified] : The patient is a 82 year old [LEFT] hand dominant male who presents today complaining of left knee.   Date of Injury/Onset: 8/2022, flared up 2 weeks ago  Pain:    At Rest: 8/10  With Activity:  8/10  Mechanism of injury: insidious  This is NOT a Work Related Injury being treated under Worker's Compensation. This is NOT an athletic injury occurring associated with an interscholastic or organized sports team. Quality of symptoms: aching  Improves with: CSI, stretching  Worse with: activity, bending  Treatment/Imaging/Studies Since Last Visit: CSI and ASP 9/14/22, which helped for about 5 months 	Reports Available For Review Today: n/a Out of work/sport: _, since _ School/Sport/Position/Occupation:retired   Change since last visit: pain has worsened, no new injury  Additional Information:  hx of Patellar bone spurs, PF OA

## 2024-04-24 NOTE — ADDENDUM
[FreeTextEntry1] :  Documented by Romero Dueñas acting as a scribe for Dr. Helton and Ronnie Galvez PA-C on 04/24/2024 and was presence for the following sections: Physical Exam; Data Reviewed; Assessment; Discussion/Summary. All medical record entries made by the Scribe were at my, Dr. Helton, and Ronnie Galvez, direction and personally dictated by me on 04/24/2024. I have reviewed the chart and agree that the record accurately reflects my personal performance of the history, physical exam, procedure and imaging.

## 2024-04-24 NOTE — DATA REVIEWED
[FreeTextEntry1] : 4/24/24 [facility] X RAY Right Knee; 4 view: This scan was reviewed and interpreted by Dr. Helton, and his findings are-  Mild OA

## 2024-12-16 ENCOUNTER — APPOINTMENT (OUTPATIENT)
Dept: MRI IMAGING | Facility: CLINIC | Age: 84
End: 2024-12-16
Payer: MEDICARE

## 2024-12-16 ENCOUNTER — APPOINTMENT (OUTPATIENT)
Dept: ORTHOPEDIC SURGERY | Facility: CLINIC | Age: 84
End: 2024-12-16
Payer: MEDICARE

## 2024-12-16 VITALS — BODY MASS INDEX: 28.93 KG/M2 | WEIGHT: 180 LBS | HEIGHT: 66 IN

## 2024-12-16 PROCEDURE — 73721 MRI JNT OF LWR EXTRE W/O DYE: CPT | Mod: RT

## 2024-12-16 PROCEDURE — L1833: CPT | Mod: RT

## 2024-12-16 PROCEDURE — 99214 OFFICE O/P EST MOD 30 MIN: CPT

## 2024-12-16 PROCEDURE — 73564 X-RAY EXAM KNEE 4 OR MORE: CPT | Mod: RT

## 2024-12-16 RX ORDER — METHYLPREDNISOLONE 4 MG/1
4 TABLET ORAL
Qty: 1 | Refills: 0 | Status: ACTIVE | COMMUNITY
Start: 2024-12-16 | End: 1900-01-01

## 2024-12-17 NOTE — DISCUSSION/SUMMARY
[de-identified] :  Reviewed all X-ray images with patient, interpretation was provided. STAT MRI of the right knee to eval patellar tendon tear. ROM knee brace Rx Medrol dose pac Follow up after scan.     ----------------------------------------------- Home Exercise The patient is instructed on a home exercise program.  CORNELIUS DALE Acting as a Scribe for Dr. Priscilla WATKINS, Cornelius Dale, attest that this documentation has been prepared under the direction and in the presence of Provider Fidel Wells MD.  Activity Modification The patient was advised to modify their activities.  Dx / Natural History The patient was advised of the diagnosis.  The natural history of the pathology was explained in full to the patient in layman's terms.  Several different treatment options were discussed and explained in full to the patient including the risks and benefits of both surgical and non-surgical treatments.  All questions and concerns were answered.  Pain Guide Activities The patient was advised to let pain guide the gradual advancement of activities.  TIFFANIE WATKINS explained to the patient that rest, ice, compression, and elevation would benefit them.  They may return to activity after follow-up or when they no longer have any pain.  The patient's current medication management of their orthopedic diagnosis was reviewed today: (1) We discussed a comprehensive treatment plan that included possible pharmaceutical management involving the use of prescription strength medications including but not limited to options such as oral Naprosyn 500mg BID, once daily Meloxicam 15 mg, or 500-650 mg Tylenol versus over the counter oral medications and topical prescription NSAID Pennsaid vs over the counter Voltaren gel. (2) There is a moderate risk of morbidity with further treatment, especially from use of prescription strength medications and possible side effects of these medications which include upset stomach with oral medications, skin reactions to topical medications and cardiac/renal issues with long term use. (3) I recommended that the patient follow-up with their medical physician to discuss any significant specific potential issues with long term medication use such as interactions with current medications or with exacerbation of underlying medical comorbidities. (4) The benefits and risks associated with use of injectable, oral or topical, prescription and over the counter anti-inflammatory medications were discussed with the patient. The patient voiced understanding of the risks including but not limited to bleeding, stroke, kidney dysfunction, heart disease, and were referred to the black box warning label for further information.

## 2024-12-17 NOTE — PHYSICAL EXAM
[de-identified] : Neurologic: normal sensation, normal mood and affect, orientated and able to communicate   Constitutional: well developed and well nourished    Right Knee: small effusion  ROM 0-70 Unable to straight leg raise swelling and erythema over tibial tubercle tenderness over tibial tubercle stable  neuro intact

## 2024-12-17 NOTE — PHYSICAL EXAM
[de-identified] : Neurologic: normal sensation, normal mood and affect, orientated and able to communicate   Constitutional: well developed and well nourished    Right Knee: small effusion  ROM 0-70 Unable to straight leg raise swelling and erythema over tibial tubercle tenderness over tibial tubercle stable  neuro intact

## 2024-12-17 NOTE — HISTORY OF PRESENT ILLNESS
[de-identified] : The patient is a 84 year old [RIGHT] hand dominant male who presents today complaining of RT KNEE Date of Injury/Onset: 12/12/24 Pain:    At Rest: 9/10  With Activity:  9/10  Mechanism of injury: no specific HAYES- tried new exercises at gym last week This is NOT a Work Related Injury being treated under Worker's Compensation. This is NOT an athletic injury occurring associated with an interscholastic or organized sports team. Quality of symptoms: constant pain, stiffness  Improves with: standing, activity modification  Worse with: inc activity, flexion Prior treatment: Previously treated by physician for LT KNEE- CSI 4/24/24 decreased all LT KNEE pain Prior Imaging: NONE Out of work/sport: retired School/Sport/Position/Occupation: retired Additional Information: Reports hx of gout- usually get flair of in big toe.

## 2024-12-17 NOTE — DATA REVIEWED
[FreeTextEntry1] : 12/16/24 [facility] X-Ray Examination of the RIGHT KNEE: 4 views: Grade 3 Tri comp OA, possible bony evulsion fracture at tibial tubercle

## 2024-12-17 NOTE — REASON FOR VISIT
Final Anesthesia Post-op Assessment    Patient: Jesse Bravo  Procedure(s) Performed: COLONOSCOPY  Anesthesia type: MAC    Vitals Value Taken Time   Temp 36.7 08/12/22 1013   Pulse 70 08/12/22 1001   Resp 16 08/12/22 1001   SpO2 97 % 08/12/22 1001   /63 08/12/22 1001         Patient Location: Phase II  Post-op Vital Signs:stable  Level of Consciousness: participates in exam, answers questions appropriately, awake, alert and oriented  Respiratory Status: spontaneous ventilation  Cardiovascular blood pressure returned to baseline and stable  Hydration: euvolemic  Pain Management: adequately controlled  Nausea: None  Airway Patency:patent  Post-op Assessment: awake, alert, appropriately conversant, or baseline, no complications and patient tolerated procedure well with no complications      No complications documented.   
[FreeTextEntry2] : new injury RT KNEE
[FreeTextEntry2] : new injury RT KNEE

## 2024-12-17 NOTE — DISCUSSION/SUMMARY
[de-identified] :  Reviewed all X-ray images with patient, interpretation was provided. STAT MRI of the right knee to eval patellar tendon tear. ROM knee brace Rx Medrol dose pac Follow up after scan.     ----------------------------------------------- Home Exercise The patient is instructed on a home exercise program.  CORNELIUS DALE Acting as a Scribe for Dr. Priscilla WATKINS, Cornelius Dale, attest that this documentation has been prepared under the direction and in the presence of Provider Fidel Wells MD.  Activity Modification The patient was advised to modify their activities.  Dx / Natural History The patient was advised of the diagnosis.  The natural history of the pathology was explained in full to the patient in layman's terms.  Several different treatment options were discussed and explained in full to the patient including the risks and benefits of both surgical and non-surgical treatments.  All questions and concerns were answered.  Pain Guide Activities The patient was advised to let pain guide the gradual advancement of activities.  TIFFANIE WATKINS explained to the patient that rest, ice, compression, and elevation would benefit them.  They may return to activity after follow-up or when they no longer have any pain.  The patient's current medication management of their orthopedic diagnosis was reviewed today: (1) We discussed a comprehensive treatment plan that included possible pharmaceutical management involving the use of prescription strength medications including but not limited to options such as oral Naprosyn 500mg BID, once daily Meloxicam 15 mg, or 500-650 mg Tylenol versus over the counter oral medications and topical prescription NSAID Pennsaid vs over the counter Voltaren gel. (2) There is a moderate risk of morbidity with further treatment, especially from use of prescription strength medications and possible side effects of these medications which include upset stomach with oral medications, skin reactions to topical medications and cardiac/renal issues with long term use. (3) I recommended that the patient follow-up with their medical physician to discuss any significant specific potential issues with long term medication use such as interactions with current medications or with exacerbation of underlying medical comorbidities. (4) The benefits and risks associated with use of injectable, oral or topical, prescription and over the counter anti-inflammatory medications were discussed with the patient. The patient voiced understanding of the risks including but not limited to bleeding, stroke, kidney dysfunction, heart disease, and were referred to the black box warning label for further information.

## 2024-12-17 NOTE — HISTORY OF PRESENT ILLNESS
[de-identified] : The patient is a 84 year old [RIGHT] hand dominant male who presents today complaining of RT KNEE Date of Injury/Onset: 12/12/24 Pain:    At Rest: 9/10  With Activity:  9/10  Mechanism of injury: no specific HAYES- tried new exercises at gym last week This is NOT a Work Related Injury being treated under Worker's Compensation. This is NOT an athletic injury occurring associated with an interscholastic or organized sports team. Quality of symptoms: constant pain, stiffness  Improves with: standing, activity modification  Worse with: inc activity, flexion Prior treatment: Previously treated by physician for LT KNEE- CSI 4/24/24 decreased all LT KNEE pain Prior Imaging: NONE Out of work/sport: retired School/Sport/Position/Occupation: retired Additional Information: Reports hx of gout- usually get flair of in big toe.

## 2024-12-18 ENCOUNTER — APPOINTMENT (OUTPATIENT)
Dept: ORTHOPEDIC SURGERY | Facility: CLINIC | Age: 84
End: 2024-12-18
Payer: MEDICARE

## 2024-12-18 VITALS — HEIGHT: 66 IN | BODY MASS INDEX: 28.93 KG/M2 | WEIGHT: 180 LBS

## 2024-12-18 DIAGNOSIS — M79.18 MYALGIA, OTHER SITE: ICD-10-CM

## 2024-12-18 DIAGNOSIS — S89.91XA UNSPECIFIED INJURY OF RIGHT LOWER LEG, INITIAL ENCOUNTER: ICD-10-CM

## 2024-12-18 DIAGNOSIS — M23.91 UNSPECIFIED INTERNAL DERANGEMENT OF RIGHT KNEE: ICD-10-CM

## 2024-12-18 DIAGNOSIS — M25.561 PAIN IN RIGHT KNEE: ICD-10-CM

## 2024-12-18 PROCEDURE — 99214 OFFICE O/P EST MOD 30 MIN: CPT

## 2024-12-18 NOTE — HISTORY OF PRESENT ILLNESS
[de-identified] : The patient is a 84 year old [RIGHT] hand dominant male who presents today complaining of RT KNEE Date of Injury/Onset: 12/12/24 Pain:    At Rest: 9/10  With Activity:  9/10  Mechanism of injury: no specific HAYES- tried new exercises at gym last week This is NOT a Work Related Injury being treated under Worker's Compensation. This is NOT an athletic injury occurring associated with an interscholastic or organized sports team. Quality of symptoms: constant pain, stiffness  Improves with: standing, activity modification  Worse with: inc activity, flexion Treatment/Imaging/Studies Since Last Visit: MRI  	Reports Available For Review Today: MRI @ [facility] 12/16/24 Change since last visit: Patient reports overall decrease in pain since last visit. present with use of XROM and cane for ambulation. C/o weakness of RT leg Out of work/sport: retired School/Sport/Position/Occupation: retired Additional Information: Reports hx of gout- usually get flair of in big toe.

## 2024-12-18 NOTE — PHYSICAL EXAM
[de-identified] : Neurologic: normal sensation, normal mood and affect, orientated and able to communicate   Constitutional: well developed and well nourished    Right Knee: small effusion  ROM 0-70 Unable to straight leg raise swelling and erythema over tibial tubercle tenderness over tibial tubercle stable  neuro intact

## 2024-12-18 NOTE — ADDENDUM
[FreeTextEntry1] : Documented by Phil Laura acting as a scribe for Dr. Wells and Juan Rodriges PA-C on 12/18/2024 and was presence for the following sections: Physical Exam; Data Reviewed; Assessment; Discussion/Summary. All medical record entries made by the Scribe were at my, Dr. Wells, and Juan Rodriges, direction and personally dictated by me on 12/18/2024. I have reviewed the chart and agree that the record accurately reflects my personal performance of the history, physical exam, procedure and imaging.

## 2024-12-18 NOTE — DATA REVIEWED
[FreeTextEntry1] : 12/16/24 [facility] X-Ray Examination of the RIGHT KNEE: 4 views: Grade 3 Tri comp OA, possible bony evulsion fracture at tibial tubercle   12/16/24 [facility] MRI Right Knee: This scan was reviewed and interpreted by Dr. Wells, and his findings are- Impression: 1. Moderate partial tearing involving central mid to distal patella tendon fibers ventrally over an area measuring 2 cm where there is severe inflammatory change and bursitis without full-thickness patella tendon discontinuity or malalignment of the patella. Clinical correlation regarding any trauma or inflammatory arthropathy is recommended. Soft tissue infection should be ruled out clinically. 3. Degenerative changes throughout the right knee with complex degenerative medial meniscal tearing, anterior lateral meniscal tear, chronic ligament sprains and laxity, extensor mechanism tendinopathy, effusion and synovitis, multiple plicae, and nonspecific moderate soft tissue swelling surrounding the knee greatest anteriorly without acute fracture. 4. Clinical correlation and follow up is recommended.

## 2024-12-18 NOTE — DISCUSSION/SUMMARY
[de-identified] : Reviewed all MRI images with patient today and interpretation was provided. ROM brace adjusted during today's visit. Patient was given prescription of formal physical therapy for strengthening and stretching. PARTIAL PATELLAR TENDON TEAR Patient advised to apply Voltaren gel to right knee. Patient will follow up in 6 weeks.     **PT at [facility]** ***Has Cardiac Stents***     ----------------------------------------------- Home Exercise The patient is instructed on a home exercise program.  CESAR ECHAVARRIA Acting as a Scribe for Dr. Priscilla WATKINS, Cesar Echavarria, attest that this documentation has been prepared under the direction and in the presence of Provider Dr. Wells.  Activity Modification The patient was advised to modify their activities.  Dx / Natural History The patient was advised of the diagnosis. The natural history of the pathology was explained in full to the patient in layman's terms. Several different treatment options were discussed and explained in full to the patient including the risks and benefits of both surgical and non-surgical treatments.  All questions and concerns were answered.  Pain Guide Activities The patient was advised to let pain guide the gradual advancement of activities.  RICE I explained to the patient that rest, ice, compression, and elevation would benefit them. They may return to activity after follow-up or when they no longer have any pain.  The patient's current medication management of their orthopedic diagnosis was reviewed today: (1) We discussed a comprehensive treatment plan that included possible pharmaceutical management involving the use of prescription strength medications including but not limited to options such as oral Naprosyn 500mg BID, once daily Meloxicam 15 mg, or 500-650 mg Tylenol versus over the counter oral medications and topical prescription NSAID Pennsaid vs over the counter Voltaren gel. (2) There is a moderate risk of morbidity with further treatment, especially from use of prescription strength medications and possible side effects of these medications which include upset stomach with oral medications, skin reactions to topical medications and cardiac/renal issues with long term use. (3) I recommended that the patient follow-up with their medical physician to discuss any significant specific potential issues with long term medication use such as interactions with current medications or with exacerbation of underlying medical comorbidities. (4) The benefits and risks associated with use of injectable, oral or topical, prescription and over the counter anti-inflammatory medications were discussed with the patient. The patient voiced understanding of the risks including but not limited to bleeding, stroke, kidney dysfunction, heart disease, and were referred to the black box warning label for further information.

## 2025-01-29 ENCOUNTER — APPOINTMENT (OUTPATIENT)
Dept: ORTHOPEDIC SURGERY | Facility: CLINIC | Age: 85
End: 2025-01-29
Payer: MEDICARE

## 2025-01-29 DIAGNOSIS — M79.18 MYALGIA, OTHER SITE: ICD-10-CM

## 2025-01-29 DIAGNOSIS — M25.561 PAIN IN RIGHT KNEE: ICD-10-CM

## 2025-01-29 DIAGNOSIS — M23.91 UNSPECIFIED INTERNAL DERANGEMENT OF RIGHT KNEE: ICD-10-CM

## 2025-01-29 DIAGNOSIS — S89.91XA UNSPECIFIED INJURY OF RIGHT LOWER LEG, INITIAL ENCOUNTER: ICD-10-CM

## 2025-01-29 PROCEDURE — 99214 OFFICE O/P EST MOD 30 MIN: CPT

## 2025-01-29 NOTE — PHYSICAL EXAM
[de-identified] : Neurologic: normal sensation, normal mood and affect, orientated and able to communicate Constitutional: well developed and well nourished  Right Knee: small effusion  ROM 0-70 Unable to straight leg raise swelling and erythema over tibial tubercle tenderness over tibial tubercle Neurovascularly intact distally

## 2025-01-29 NOTE — DISCUSSION/SUMMARY
[de-identified] : Patient advised to continue wearing Reparel knee sleeve. Patient will continue physical therapy for strengthening and stretching. Patient will follow up in 1 month.   Patient has tried physical therapy, anti-inflammatories, rest, with no improvement. Let this note serve as a letter of medical necessity for authorization of hyaluronic acid visco injection(s).  ----------------------------------------------- Home Exercise The patient is instructed on a home exercise program.  CESAR ECHAVARRIA Acting as a Scribe for Dr. Priscilla WATKINS, Cesar Echavarria, attest that this documentation has been prepared under the direction and in the presence of Provider Dr. Wells.  Activity Modification The patient was advised to modify their activities.  Dx / Natural History The patient was advised of the diagnosis. The natural history of the pathology was explained in full to the patient in layman's terms. Several different treatment options were discussed and explained in full to the patient including the risks and benefits of both surgical and non-surgical treatments.  All questions and concerns were answered.  Pain Guide Activities The patient was advised to let pain guide the gradual advancement of activities.  TIFFANIE WATKINS explained to the patient that rest, ice, compression, and elevation would benefit them. They may return to activity after follow-up or when they no longer have any pain.  The patient's current medication management of their orthopedic diagnosis was reviewed today: (1) We discussed a comprehensive treatment plan that included possible pharmaceutical management involving the use of prescription strength medications including but not limited to options such as oral Naprosyn 500mg BID, once daily Meloxicam 15 mg, or 500-650 mg Tylenol versus over the counter oral medications and topical prescription NSAID Pennsaid vs over the counter Voltaren gel. (2) There is a moderate risk of morbidity with further treatment, especially from use of prescription strength medications and possible side effects of these medications which include upset stomach with oral medications, skin reactions to topical medications and cardiac/renal issues with long term use. (3) I recommended that the patient follow-up with their medical physician to discuss any significant specific potential issues with long term medication use such as interactions with current medications or with exacerbation of underlying medical comorbidities. (4) The benefits and risks associated with use of injectable, oral or topical, prescription and over the counter anti-inflammatory medications were discussed with the patient. The patient voiced understanding of the risks including but not limited to bleeding, stroke, kidney dysfunction, heart disease, and were referred to the black box warning label for further information.

## 2025-02-26 ENCOUNTER — APPOINTMENT (OUTPATIENT)
Dept: ORTHOPEDIC SURGERY | Facility: CLINIC | Age: 85
End: 2025-02-26

## 2025-03-27 ENCOUNTER — APPOINTMENT (OUTPATIENT)
Dept: NEUROLOGY | Facility: CLINIC | Age: 85
End: 2025-03-27
Payer: MEDICARE

## 2025-03-27 VITALS
TEMPERATURE: 98.3 F | HEIGHT: 66 IN | SYSTOLIC BLOOD PRESSURE: 170 MMHG | HEART RATE: 63 BPM | BODY MASS INDEX: 27.97 KG/M2 | DIASTOLIC BLOOD PRESSURE: 73 MMHG | WEIGHT: 174 LBS

## 2025-03-27 DIAGNOSIS — R20.0 ANESTHESIA OF SKIN: ICD-10-CM

## 2025-03-27 DIAGNOSIS — R20.2 ANESTHESIA OF SKIN: ICD-10-CM

## 2025-03-27 PROCEDURE — 99204 OFFICE O/P NEW MOD 45 MIN: CPT

## 2025-03-27 RX ORDER — CLOPIDOGREL BISULFATE 75 MG/1
75 TABLET, FILM COATED ORAL
Refills: 0 | Status: ACTIVE | COMMUNITY

## 2025-03-27 RX ORDER — ENALAPRIL MALEATE 5 MG/1
TABLET ORAL
Refills: 0 | Status: ACTIVE | COMMUNITY

## 2025-03-27 RX ORDER — GABAPENTIN 100 MG/1
100 CAPSULE ORAL
Refills: 0 | Status: ACTIVE | COMMUNITY

## 2025-03-27 RX ORDER — ASPIRIN 81 MG/1
81 TABLET ORAL
Refills: 0 | Status: ACTIVE | COMMUNITY

## 2025-03-27 RX ORDER — CARVEDILOL 6.25 MG/1
6.25 TABLET, FILM COATED ORAL
Refills: 0 | Status: ACTIVE | COMMUNITY

## 2025-03-27 NOTE — HISTORY OF PRESENT ILLNESS
[FreeTextEntry1] : 84-year-old man history of hypertension, hyperlipidemia, coronary artery disease, gout, osteoarthritis, history of prostate cancer, status post prostatectomy, complaining of chronic back pain, generalized arthralgias foot pain, sensation of burning sensations of the feet now for few years, evaluated by orthopedics, podiatry, has tried gabapentin which she still continues 100 mg in the morning and 200 mg at night which is not sure if is helpful. Is also noted limitation of range of walking, plays golf, but he has trouble keeping up and the pace that he would like, because of discomfort, mainly of the feet.  He had to stop, wait before his symptoms go away. He also for the last year or so, and complaining of numbness involving both hands mainly the index, middle and thumb.  Sometimes will wake him up at night.  No weakness, no change in bowel bladder habits.  No recent falls. For review there is an MRI of the lower back performed in 2022 showed multiple degenerative spondylosis, mild to moderate spinal stenosis at the L4-5, L5-S1. Also blood work, serum protein electrophoresis, ESR, CBC, metabolic panel hemoglobin A1c, within normal ranges.  His B12 level, borderline low in the 200s. He reports being on B12 oral supplementation. Denies any neck pain no radiating pain to the shoulders.

## 2025-03-27 NOTE — PHYSICAL EXAM
[General Appearance - Alert] : alert [General Appearance - In No Acute Distress] : in no acute distress [Oriented To Time, Place, And Person] : oriented to person, place, and time [Impaired Insight] : insight and judgment were intact [Affect] : the affect was normal [Person] : oriented to person [Place] : oriented to place [Time] : oriented to time [Concentration Intact] : normal concentrating ability [Visual Intact] : visual attention was ~T not ~L decreased [Naming Objects] : no difficulty naming common objects [Repeating Phrases] : no difficulty repeating a phrase [Writing A Sentence] : no difficulty writing a sentence [Fluency] : fluency intact [Comprehension] : comprehension intact [Reading] : reading intact [Past History] : adequate knowledge of personal past history [Cranial Nerves Optic (II)] : visual acuity intact bilaterally,  visual fields full to confrontation, pupils equal round and reactive to light [Cranial Nerves Oculomotor (III)] : extraocular motion intact [Cranial Nerves Trigeminal (V)] : facial sensation intact symmetrically [Cranial Nerves Facial (VII)] : face symmetrical [Cranial Nerves Vestibulocochlear (VIII)] : hearing was intact bilaterally [Cranial Nerves Glossopharyngeal (IX)] : tongue and palate midline [Cranial Nerves Accessory (XI - Cranial And Spinal)] : head turning and shoulder shrug symmetric [Cranial Nerves Hypoglossal (XII)] : there was no tongue deviation with protrusion [Motor Tone] : muscle tone was normal in all four extremities [Motor Strength] : muscle strength was normal in all four extremities [No Muscle Atrophy] : normal bulk in all four extremities [Motor Handedness Left-Handed] : the patient is left hand dominant [Sensation Tactile Decrease] : light touch was intact [Proprioception] : proprioception was intact [Pain / Temp Decrease Lateral Leg & Dorsum Of Foot Right Only] : diminished right lateral leg and dorsum of the foot (L5) [Pain / Temp Decrease Lateral Leg & Dorsum Of Foot Left Only] : diminished left lateral leg and dorsum of the foot (L5) [Vibration Decrease Leg / Foot Toes Both Feet] : decreased at the toes of both feet  [Abnormal Walk] : normal gait [Balance] : balance was intact [2+] : Ankle jerk left 2+ [Full Pulse] : the pedal pulses are present [Paresis Pronator Drift Right-Sided] : no pronator drift on the right [Paresis Pronator Drift Left-Sided] : no pronator drift on the left [Pain / Temp Decrease Knee And Medial Leg (L4) - Right Only] : normal right knee and medial leg (L4) [Pain / Temp Decrease Knee And Medial Leg (L4) - Left Only] : normal left knee and medial leg (L4) [Pain / Temp Decrease Sole Of Foot & Posterior Leg (S1) Rt.] : normal right sole of the foot and posterior leg (S1) [Pain / Temp Decrease Sole Of Foot & Posterior Leg Left Only] : normal left sole of the foot and posterior leg (S1) [Vibration Decrease Arm / Hand Bilateral] : normal in both arms and hands [Past-pointing] : there was no past-pointing [Tremor] : no tremor present [Plantar Reflex Right Only] : normal on the right [Plantar Reflex Left Only] : normal on the left

## 2025-03-27 NOTE — DISCUSSION/SUMMARY
[FreeTextEntry1] : 84-year-old man complaining of difficulty walking, chronic back pain, foot pain, sensation of burning tingling, mostly in the feet but also the hands.  Examination actively suppressed reflexes, no long track signs, diminished pinprick in both legs at the L5 distribution, reduced vibration distally.  Possible underlying neuropathy, B12 deficiency.  Possible lumbar stenosis with bilateral radiculopathy especially at the L5 nerve roots. Rule out carpal tunnel. Plan: Will order a repeat B12, homocystine and methylmalonic acid serum levels. Advised to continue with oral supplementation 1000 mcg daily. Electromyography of the hands, and 1 leg.  Rule out neuropathy rule out carpal tunnel rule out chronic lumbar radiculopathy. Reviewed and discussed his present dose of gabapentin, advised him he can titrate up, to 200 twice a day, do that for a week if it does not help or and he tolerated well he can go to 200 the morning 300 night to do for another week, reevaluate at the end, if he needs he can go to 300 twice a day. Continue prescription concocted me. Return after the above.

## 2025-03-27 NOTE — DATA REVIEWED
[de-identified] :  MRI-1.2T LUMBAR SPINE NON CONTRAST             Final  No Documents Attached    	 MRI-1.2T LUMBAR SPINE NON CONTRAST  History: Low back pain  MRI of the lumbar spine was performed on a 1.2 Jordana magnet.  Comparison: There are no prior relevant studies available for comparison.  There is no fracture. There is a focus of decreased T1 and increased T2 signal involving the left anterior L3 vertebral body, believed to represent a hemangioma with somewhat atypical imaging characteristics.  T12-L1: There is no disc herniation, significant central canal or right foraminal stenosis. There is left facet arthropathy minimally encroach upon the left neural foramen.  L1-2: There is no disc herniation, significant central canal or neural foraminal stenosis.  L2-3: There is disc desiccation and disc bulge with slight retrolisthesis. There is a small superimposed central disc protrusion impinging upon the thecal sac. There is mild bilateral facet arthropathy and minimal spinal stenosis.  L3-4: There is disc desiccation and mild disc bulge with broad-based left lateral and foraminal disc herniation impinging upon the left L3 nerve root. There is no significant central canal stenosis.  L4-5: There is endplate osteophyte and disc bulge encroaching upon the neural foramina and L4 nerve roots bilaterally. There is bilateral facet arthropathy without significant central canal stenosis.  L5-S1: There is disc desiccation compatible disc degeneration. There is moderate disc bulge, with a small superimposed central disc herniation, and Modic type II endplate change. There is bilateral facet arthropathy contributing to bilateral foraminal encroachment. There is no significant central canal stenosis. ----- Page Break ----- The conus is normal in position, configuration and signal characteristics.  IMPRESSION:  Left facet arthropathy, T12/L1.  Slight retrolisthesis L2-3, with disc bulge and small superimposed central disc protrusion. There is mild bilateral facet arthropathy and minimal spinal stenosis.  Disc bulge with broad-based left lateral foraminal disc herniation L3-4, impinging upon left L3 nerve root.  Endplate osteophyte and disc bulge L4-5 encroaching upon the foraminal L4 nerve roots bilaterally.  Modic type II endplate change L5/S1 with disc bulge and small superimposed central disc herniation. There is bilateral facet arthropathy contributing to bilateral foraminal encroachment.  Signed by: Laury Singh MD  Signed Date: 4/18/2022 6:50 AM EDT    Electronically Signed By: Francisco TAN, Ext. [phone], Laury Sign Date: 18-APR-22

## 2025-03-27 NOTE — REVIEW OF SYSTEMS
[Numbness] : numbness [Tingling] : tingling [Abnormal Sensation] : an abnormal sensation [As Noted in HPI] : as noted in HPI [Arthralgias] : arthralgias [Joint Pain] : joint pain [Joint Stiffness] : joint stiffness [Negative] : Heme/Lymph

## 2025-03-31 ENCOUNTER — EMERGENCY (EMERGENCY)
Facility: HOSPITAL | Age: 85
LOS: 1 days | Discharge: HOME | End: 2025-03-31
Attending: STUDENT IN AN ORGANIZED HEALTH CARE EDUCATION/TRAINING PROGRAM
Payer: MEDICARE

## 2025-03-31 VITALS
WEIGHT: 180.78 LBS | DIASTOLIC BLOOD PRESSURE: 89 MMHG | TEMPERATURE: 98 F | HEART RATE: 69 BPM | HEIGHT: 65 IN | SYSTOLIC BLOOD PRESSURE: 238 MMHG | RESPIRATION RATE: 20 BRPM | OXYGEN SATURATION: 98 %

## 2025-03-31 DIAGNOSIS — Z90.79 ACQUIRED ABSENCE OF OTHER GENITAL ORGAN(S): Chronic | ICD-10-CM

## 2025-03-31 LAB
ANION GAP SERPL CALC-SCNC: 12 MMOL/L — SIGNIFICANT CHANGE UP (ref 5–17)
BASOPHILS # BLD AUTO: 0.07 K/UL — SIGNIFICANT CHANGE UP (ref 0–0.2)
BASOPHILS NFR BLD AUTO: 1.1 % — SIGNIFICANT CHANGE UP (ref 0–2)
BUN SERPL-MCNC: 20.8 MG/DL — HIGH (ref 8–20)
CALCIUM SERPL-MCNC: 9.6 MG/DL — SIGNIFICANT CHANGE UP (ref 8.4–10.5)
CHLORIDE SERPL-SCNC: 104 MMOL/L — SIGNIFICANT CHANGE UP (ref 96–108)
CO2 SERPL-SCNC: 23 MMOL/L — SIGNIFICANT CHANGE UP (ref 22–29)
CREAT SERPL-MCNC: 1.24 MG/DL — SIGNIFICANT CHANGE UP (ref 0.5–1.3)
EGFR: 57 ML/MIN/1.73M2 — LOW
EGFR: 57 ML/MIN/1.73M2 — LOW
EOSINOPHIL # BLD AUTO: 0.57 K/UL — HIGH (ref 0–0.5)
EOSINOPHIL NFR BLD AUTO: 9 % — HIGH (ref 0–6)
GLUCOSE SERPL-MCNC: 111 MG/DL — HIGH (ref 70–99)
HCT VFR BLD CALC: 41.6 % — SIGNIFICANT CHANGE UP (ref 39–50)
HGB BLD-MCNC: 13.9 G/DL — SIGNIFICANT CHANGE UP (ref 13–17)
IMM GRANULOCYTES # BLD AUTO: 0.02 K/UL — SIGNIFICANT CHANGE UP (ref 0–0.07)
IMM GRANULOCYTES NFR BLD AUTO: 0.3 % — SIGNIFICANT CHANGE UP (ref 0–0.9)
LYMPHOCYTES # BLD AUTO: 0.85 K/UL — LOW (ref 1–3.3)
LYMPHOCYTES NFR BLD AUTO: 13.4 % — SIGNIFICANT CHANGE UP (ref 13–44)
MCHC RBC-ENTMCNC: 32.6 PG — SIGNIFICANT CHANGE UP (ref 27–34)
MCHC RBC-ENTMCNC: 33.4 G/DL — SIGNIFICANT CHANGE UP (ref 32–36)
MCV RBC AUTO: 97.4 FL — SIGNIFICANT CHANGE UP (ref 80–100)
MONOCYTES # BLD AUTO: 0.68 K/UL — SIGNIFICANT CHANGE UP (ref 0–0.9)
MONOCYTES NFR BLD AUTO: 10.7 % — SIGNIFICANT CHANGE UP (ref 2–14)
NEUTROPHILS # BLD AUTO: 4.16 K/UL — SIGNIFICANT CHANGE UP (ref 1.8–7.4)
NEUTROPHILS NFR BLD AUTO: 65.5 % — SIGNIFICANT CHANGE UP (ref 43–77)
NRBC # BLD AUTO: 0 K/UL — SIGNIFICANT CHANGE UP (ref 0–0)
NRBC # FLD: 0 K/UL — SIGNIFICANT CHANGE UP (ref 0–0)
NRBC BLD AUTO-RTO: 0 /100 WBCS — SIGNIFICANT CHANGE UP (ref 0–0)
PLATELET # BLD AUTO: 191 K/UL — SIGNIFICANT CHANGE UP (ref 150–400)
PMV BLD: 10.3 FL — SIGNIFICANT CHANGE UP (ref 7–13)
POTASSIUM SERPL-MCNC: 4.3 MMOL/L — SIGNIFICANT CHANGE UP (ref 3.5–5.3)
POTASSIUM SERPL-SCNC: 4.3 MMOL/L — SIGNIFICANT CHANGE UP (ref 3.5–5.3)
RBC # BLD: 4.27 M/UL — SIGNIFICANT CHANGE UP (ref 4.2–5.8)
RBC # FLD: 13.3 % — SIGNIFICANT CHANGE UP (ref 10.3–14.5)
SODIUM SERPL-SCNC: 139 MMOL/L — SIGNIFICANT CHANGE UP (ref 135–145)
TROPONIN T, HIGH SENSITIVITY RESULT: 48 NG/L — SIGNIFICANT CHANGE UP (ref 0–51)
WBC # BLD: 6.35 K/UL — SIGNIFICANT CHANGE UP (ref 3.8–10.5)
WBC # FLD AUTO: 6.35 K/UL — SIGNIFICANT CHANGE UP (ref 3.8–10.5)

## 2025-03-31 PROCEDURE — 99223 1ST HOSP IP/OBS HIGH 75: CPT

## 2025-03-31 PROCEDURE — 93010 ELECTROCARDIOGRAM REPORT: CPT

## 2025-03-31 RX ORDER — CLOPIDOGREL BISULFATE 75 MG/1
75 TABLET, FILM COATED ORAL DAILY
Refills: 0 | Status: DISCONTINUED | OUTPATIENT
Start: 2025-03-31 | End: 2025-04-07

## 2025-03-31 RX ORDER — ENALAPRIL MALEATE 20 MG
20 TABLET ORAL ONCE
Refills: 0 | Status: COMPLETED | OUTPATIENT
Start: 2025-03-31 | End: 2025-03-31

## 2025-03-31 RX ORDER — ENALAPRIL MALEATE 20 MG
20 TABLET ORAL
Refills: 0 | Status: DISCONTINUED | OUTPATIENT
Start: 2025-03-31 | End: 2025-04-07

## 2025-03-31 RX ORDER — LATANOPROST PF 0.05 MG/ML
1 SOLUTION/ DROPS OPHTHALMIC AT BEDTIME
Refills: 0 | Status: DISCONTINUED | OUTPATIENT
Start: 2025-03-31 | End: 2025-04-07

## 2025-03-31 RX ORDER — ENALAPRIL MALEATE 20 MG
1 TABLET ORAL
Refills: 0 | DISCHARGE

## 2025-03-31 RX ORDER — ATORVASTATIN CALCIUM 80 MG/1
1 TABLET, FILM COATED ORAL
Refills: 0 | DISCHARGE

## 2025-03-31 RX ORDER — GABAPENTIN 400 MG/1
2 CAPSULE ORAL
Refills: 0 | DISCHARGE

## 2025-03-31 RX ORDER — CARVEDILOL 3.12 MG/1
1 TABLET, FILM COATED ORAL
Refills: 0 | DISCHARGE

## 2025-03-31 RX ORDER — ASPIRIN 325 MG
81 TABLET ORAL DAILY
Refills: 0 | Status: DISCONTINUED | OUTPATIENT
Start: 2025-03-31 | End: 2025-04-07

## 2025-03-31 RX ORDER — ATORVASTATIN CALCIUM 80 MG/1
10 TABLET, FILM COATED ORAL AT BEDTIME
Refills: 0 | Status: DISCONTINUED | OUTPATIENT
Start: 2025-03-31 | End: 2025-04-07

## 2025-03-31 RX ORDER — CLOPIDOGREL BISULFATE 75 MG/1
1 TABLET, FILM COATED ORAL
Refills: 0 | DISCHARGE

## 2025-03-31 RX ORDER — LATANOPROST PF 0.05 MG/ML
1 SOLUTION/ DROPS OPHTHALMIC
Refills: 0 | DISCHARGE

## 2025-03-31 RX ORDER — TRIAMCINOLONE ACETONIDE 1 MG/G
1 CREAM TOPICAL
Refills: 0 | DISCHARGE

## 2025-03-31 RX ORDER — CARVEDILOL 3.12 MG/1
6.25 TABLET, FILM COATED ORAL EVERY 12 HOURS
Refills: 0 | Status: DISCONTINUED | OUTPATIENT
Start: 2025-03-31 | End: 2025-04-07

## 2025-03-31 RX ORDER — GABAPENTIN 400 MG/1
200 CAPSULE ORAL
Refills: 0 | Status: DISCONTINUED | OUTPATIENT
Start: 2025-03-31 | End: 2025-04-07

## 2025-03-31 RX ADMIN — Medication 20 MILLIGRAM(S): at 22:51

## 2025-03-31 RX ADMIN — Medication 20 MILLIGRAM(S): at 18:40

## 2025-03-31 RX ADMIN — GABAPENTIN 200 MILLIGRAM(S): 400 CAPSULE ORAL at 22:51

## 2025-03-31 RX ADMIN — CARVEDILOL 6.25 MILLIGRAM(S): 3.12 TABLET, FILM COATED ORAL at 22:51

## 2025-03-31 RX ADMIN — CLOPIDOGREL BISULFATE 75 MILLIGRAM(S): 75 TABLET, FILM COATED ORAL at 22:56

## 2025-03-31 RX ADMIN — ATORVASTATIN CALCIUM 10 MILLIGRAM(S): 80 TABLET, FILM COATED ORAL at 22:51

## 2025-03-31 RX ADMIN — Medication 10 MILLIGRAM(S): at 20:39

## 2025-03-31 NOTE — ED ADULT NURSE REASSESSMENT NOTE - NS ED NURSE REASSESS COMMENT FT1
pt is stable on monitor no distress, pt has been refusing IV access, MD aware, butterfly labs done and sent, safety maintained

## 2025-03-31 NOTE — PHARMACOTHERAPY INTERVENTION NOTE - COMMENTS
Referenced outpatient fill record and spoke with patient at bedside to confirm medication list    Outpatient Medication Review updated.    HOME MEDICATIONS:  aspirin 81 mg oral tablet: 1 tab(s) orally once a day (31 Mar 2025 12:52)  atorvastatin 10 mg oral tablet: 1 tab(s) orally once a day (31 Mar 2025 12:51)  carvedilol 6.25 mg oral tablet: 1 tab(s) orally 2 times a day (31 Mar 2025 12:51)  clopidogrel 75 mg oral tablet: 1 tab(s) orally once a day (31 Mar 2025 12:51)  enalapril 20 mg oral tablet: 1 tab(s) orally 2 times a day (31 Mar 2025 12:51)  gabapentin 100 mg oral capsule: 2 cap(s) orally 2 times a day (31 Mar 2025 12:51)  latanoprost 0.005% ophthalmic solution: 1 drop(s) in each eye once a day (at bedtime) (31 Mar 2025 12:52)  triamcinolone 0.5% topical ointment: Apply topically to affected area left outer calf 2 times daily (31 Mar 2025 12:51)

## 2025-03-31 NOTE — ED ADULT NURSE REASSESSMENT NOTE - NS ED NURSE REASSESS COMMENT FT1
lasb done and sent clotted x3 as per labs MD aware, Rn redraw and walk tube down to lab, pending results, safety maintained, pt has no distress at this time

## 2025-03-31 NOTE — ED CDU PROVIDER INITIAL DAY NOTE - IN ACCORDANCE WITH [place] STATE LAW, WE OFFER EVERY PATIENT A HEPATITIS C TEST. WOULD YOU LIKE TO BE TESTED TODAY?
Hospital to [facility],  [address]                                                                        26 y.o.   female    [facility]   Room:     [facility] CARDIOPULMONARY CARE  Unit Phone# :  [phone]      [facility]  [facility] CARDIOPULMONARY CARE   [facility] [address]  Dept: [phone]  Loc: [phone]                    SITUATION     Admitted:  3/22/2023         Attending Provider:  Ceasar Hill MD       Consultations:  IP CONSULT TO HOSPITALIST  IP CONSULT TO NEUROLOGY  IP CONSULT TO GASTROENTEROLOGY  IP CONSULT TO HEMATOLOGY  IP CONSULT TO NEPHROLOGY  IP CONSULT TO CARDIOLOGY    PCP:  None   None    Treatment Team: Attending Provider: Ceasar Hill MD; Consulting Provider: Ameena Roberts MD; Utilization Review: Saba Montejo; Consulting Provider: Nadira Remy MD; Care Manager: Sunshine Deluna, HOLLAND; Primary Nurse: Isis Avila, RN; Primary Nurse: Anusha rBiggs RN    Admitting Dx:   Severe sepsis ([facility] [address] [A41.9, R65.20]  Acute pancreatitis [K85.90]       Principal Problem: <principal problem not specified>    * No surgery found * of      BY: * Surgery not found *             ON: * No surgery found *                  Code Status: Full Code                Advance Directives:   Advance Care Planning 3/23/2023   Confirm Advance Directive None    (Send w/patient)   Not Received       Isolation:  There are currently no Active Isolations       MDRO: No current active infections    Pain Medications given:  see MAR      Special Equipment needed: yes  Type of equipment: mechanical lift         BACKGROUND     Allergies:  No Known Allergies    Past Medical History:   Diagnosis Date    HX OTHER MEDICAL     migraines    Migraine        Past Surgical History:   Procedure Laterality Date    HX APPENDECTOMY      HX  SECTION  06    NRFHR    HX TONSILLECTOMY      IR INSERT NON TUNL CVC OVER 5 YRS  4/3/2023 [facility]  DELIVERY ONLY  06       Medications Prior to Admission   Medication Sig    methylPREDNISolone (Medrol, Nathan,) 4 mg tablet Use as directed on packaging    propranolol (INDERAL) 40 mg tablet Take 1 Tab by mouth two (2) times a day. (Patient not taking: No sig reported)       Hard scripts included in transfer packet yes    Vaccinations:    Immunization History   Administered Date(s) Administered    MMR 2013       Readmission Risks:    Known Risks: The Charlson [employer] Index tool is an evidenced based tool that has more automatic generated information. The tool looks at many different items such as the age of the patient, how many times they were admitted in the last calendar year, current length of stay in the hospital and their diagnosis. All of these items are pulled automatically from information documented in the chart from various places and will generate a score that predicts whether a patient is at low (less than 13), medium (13-20) or high (21 or greater) risk of being readmitted.         ASSESSMENT                Temp: 98.7 °F (37.1 °C) (23 1442) Pulse (Heart Rate): 92 (23 1503)     Resp Rate: 18 (23 1118)           BP: 111/72 (23 1442)     O2 Sat (%): 98 % (23 1503)     Weight: 73.8 kg (162 lb 12.8 oz)    Height: 5' 9\" (175.3 cm) (23 1945)       If above not within 1 hour of discharge:    BP:_115/48____  P:_101___  R:_18___ T:__97.6___ O2 Sat: __98_%  O2: __RA____    Active Orders   Diet    ADULT DIET Regular         Orientation: oriented to time, place, person and situation     Active Behaviors: None                                   Active Lines/Drains:  (Peg Tube / Jang / CL or S/L?): no    Urinary Status: Voiding, Bathroom privileges     Last BM: Last Bowel Movement Date: 23     Skin Integrity: Intact             Mobility: Slightly limited   Weight Bearing Status: WBAT (Weight Bearing as Tolerated)      Gait Training  Assistive Device: Gait belt, Walker, rolling  Ambulation - Level of Assistance: Moderate assistance, Assist x2  Distance (ft): 3 Feet (ft)         Lab Results   Component Value Date/Time    Glucose 96 04/21/2023 02:51 AM    INR 1.2 (H) 03/26/2023 08:33 AM    HGB 10.9 (L) 04/16/2023 12:51 AM    HGB 10.8 (L) 04/15/2023 04:00 AM        RECOMMENDATION     See After Visit Summary (AVS) for:  Discharge instructions  After [address] equipment needed (entered pre-discharge by Care Management)  Medication Reconciliation    Follow up Appointment(s)         Report given/sent by:  Dick Richards RN                    Verbal report given to:  Magy @ [facility] [phone]  FAXED to:  n/a         Estimated discharge time:  4/24/2023 at 1930 Opt out

## 2025-03-31 NOTE — ED CDU PROVIDER INITIAL DAY NOTE - CLINICAL SUMMARY MEDICAL DECISION MAKING FREE TEXT BOX
No pertinent family history in first degree relatives
85 yo male PMHx CAD w/stents, HTN, HLD, prostate cancer s/p prostatectomy presented to ED for asymptomatic elevated blood pressure reading. Antihypertensives recently changed one month ago by primary cardiologist. Trop 34>48. Pending repeat. Pending TTE and cardiology consult. Patient placed in OBS.

## 2025-03-31 NOTE — ED ADULT NURSE NOTE - BEFAST SPEECH
Patient rounding has been completed in office for this patient.A My-Chart message has been sent to the patient for patient rounding.  
Yes

## 2025-03-31 NOTE — ED ADULT TRIAGE NOTE - CHIEF COMPLAINT QUOTE
pt arrives to ED c/o hypertension, history of HTN, denies N/V/D/CP/SOB/headache, history of CAD/3 stents, on Plavix

## 2025-03-31 NOTE — ED ADULT NURSE REASSESSMENT NOTE - NS ED NURSE REASSESS COMMENT FT1
MD at bedside, labs results back abnormal, pt agrees to get IV access at this time, safety maintained

## 2025-03-31 NOTE — ED PROVIDER NOTE - NSICDXPASTMEDICALHX_GEN_ALL_CORE_FT
PAST MEDICAL HISTORY:  CAD (coronary artery disease)     Hypertension     Prostate ca     Stented coronary artery

## 2025-03-31 NOTE — ED CDU PROVIDER INITIAL DAY NOTE - OBJECTIVE STATEMENT
83 yo male PMHx CAD w/stents, HTN, HLD, prostate cancer s/p prostatectomy presented to ED for elevated blood pressure reading. About 1 month ago he states his cardiologist (Dr. Stanley, [facility]) took him off of hydralazine 100 mg BID and metoprolol 75 mg BID and switched him to carvedilol 6.25 qd. He checked his blood pressure today with two different machines and he had readings in the 200's. He states he has been compliant with his medication. He saw a neurologist for neuropathy, was not prescribed anything and his blood pressure at that time was in the 170's/70's and he went to  for a rash on his leg after working in the yard and was prescribed an ointment and his BP at that time was similar. He does not usually check his BP regularly. He denies chest pain, SOB, LE edema, HA, dizziness. He called his cardiologist's office and they recommended he come to the ED.

## 2025-03-31 NOTE — ED PROVIDER NOTE - OBSERVING MD:
Gunjan

## 2025-03-31 NOTE — ED ADULT NURSE NOTE - OBJECTIVE STATEMENT
pt has no complaint, denies any chest pain, denies any dizziness, was sent for high bp. placed on monitor, bp elevated, MD aware, labs done and sent, pt stated compliant with meds safety maintained

## 2025-03-31 NOTE — ED PROVIDER NOTE - OBJECTIVE STATEMENT
83 y/o M with PMH CAD, HTN presents for elevated blood pressure reading. About 1 month ago he states his cardiologist ( Dr. Stanley, [facility]) took him off of hydralazine 100 mg BID and metoprolol 75 mg BID and switched him to carvedilol 6.25 qd. He checked his blood pressure today with two different machines and he had readings in the 200's. He states he has been compliant with his medication. He saw a neurologist for neuropathy, was not prescribed anything and his blood pressure at that time was in the 170's/70's and he went to  for a rash on his leg after working in the yard and was prescribed an ointment and his BP at that time was similar. He does not usually check his BP regularly. He denies chest pain, SOB, LE edema, HA, dizziness. He called his cardiologist's office and they recommended he come to the ED.

## 2025-04-01 ENCOUNTER — RESULT REVIEW (OUTPATIENT)
Age: 85
End: 2025-04-01

## 2025-04-01 VITALS
OXYGEN SATURATION: 97 % | HEART RATE: 61 BPM | DIASTOLIC BLOOD PRESSURE: 61 MMHG | TEMPERATURE: 98 F | RESPIRATION RATE: 18 BRPM | SYSTOLIC BLOOD PRESSURE: 122 MMHG

## 2025-04-01 DIAGNOSIS — I16.0 HYPERTENSIVE URGENCY: ICD-10-CM

## 2025-04-01 LAB
TROPONIN T, HIGH SENSITIVITY RESULT: 31 NG/L — SIGNIFICANT CHANGE UP (ref 0–51)
TROPONIN T, HIGH SENSITIVITY RESULT: 33 NG/L — SIGNIFICANT CHANGE UP (ref 0–51)

## 2025-04-01 PROCEDURE — 80048 BASIC METABOLIC PNL TOTAL CA: CPT

## 2025-04-01 PROCEDURE — 93016 CV STRESS TEST SUPVJ ONLY: CPT

## 2025-04-01 PROCEDURE — 93356 MYOCRD STRAIN IMG SPCKL TRCK: CPT

## 2025-04-01 PROCEDURE — 78452 HT MUSCLE IMAGE SPECT MULT: CPT | Mod: MC

## 2025-04-01 PROCEDURE — 93306 TTE W/DOPPLER COMPLETE: CPT

## 2025-04-01 PROCEDURE — 99284 EMERGENCY DEPT VISIT MOD MDM: CPT | Mod: 25

## 2025-04-01 PROCEDURE — 96374 THER/PROPH/DIAG INJ IV PUSH: CPT

## 2025-04-01 PROCEDURE — 85025 COMPLETE CBC W/AUTO DIFF WBC: CPT

## 2025-04-01 PROCEDURE — 78452 HT MUSCLE IMAGE SPECT MULT: CPT | Mod: 26

## 2025-04-01 PROCEDURE — 99239 HOSP IP/OBS DSCHRG MGMT >30: CPT

## 2025-04-01 PROCEDURE — 93018 CV STRESS TEST I&R ONLY: CPT

## 2025-04-01 PROCEDURE — 36415 COLL VENOUS BLD VENIPUNCTURE: CPT

## 2025-04-01 PROCEDURE — A9500: CPT

## 2025-04-01 PROCEDURE — 93005 ELECTROCARDIOGRAM TRACING: CPT

## 2025-04-01 PROCEDURE — 93017 CV STRESS TEST TRACING ONLY: CPT

## 2025-04-01 PROCEDURE — 84484 ASSAY OF TROPONIN QUANT: CPT

## 2025-04-01 PROCEDURE — 99284 EMERGENCY DEPT VISIT MOD MDM: CPT

## 2025-04-01 PROCEDURE — 93306 TTE W/DOPPLER COMPLETE: CPT | Mod: 26

## 2025-04-01 PROCEDURE — G0378: CPT

## 2025-04-01 RX ADMIN — GABAPENTIN 200 MILLIGRAM(S): 400 CAPSULE ORAL at 05:57

## 2025-04-01 RX ADMIN — Medication 81 MILLIGRAM(S): at 12:29

## 2025-04-01 RX ADMIN — CARVEDILOL 6.25 MILLIGRAM(S): 3.12 TABLET, FILM COATED ORAL at 05:57

## 2025-04-01 RX ADMIN — CLOPIDOGREL BISULFATE 75 MILLIGRAM(S): 75 TABLET, FILM COATED ORAL at 12:29

## 2025-04-01 RX ADMIN — Medication 20 MILLIGRAM(S): at 05:57

## 2025-04-01 NOTE — CONSULT NOTE ADULT - PROBLEM SELECTOR RECOMMENDATION 9
Continue to monitor on telemetry   - Troponin 34-->48; Nonischemic EKG SR @ 67bpm   - Pt follows with Dr. Stanley OP, recently changed home regimen   - Hydralazine 10 mg IVP x 1 and enalapril 20 mg PO x 1 administered per ED team with improvement   - Continue coreg 6.25 mg PO BID, enalapril 20 mg PO BID, & hydralazine PRN    - Hold for SBP < 100mmHg or HR < 60bpm  - Lifestyle modifications (DASH diet, daily exercise encouraged, weight loss, limit ETOH intake, avoid NSAIDs)   - Pending TTE for evaluation of cardiac function, WMA, and any valvular abnormalities.   - MAP should not be lowered by more than 25 to 30% over the first several hours    Assessment and recommendations are final when note is signed by the attending. Continue to monitor on telemetry   - Troponin 34-->48, continue to trend; Nonischemic EKG SR @ 67bpm   - Pt follows with Dr. Jaden GOMEZ, recently changed home regimen   - Hydralazine 10 mg IVP x 1 and enalapril 20 mg PO x 1 administered per ED team with improvement   - Continue coreg 6.25 mg PO BID, enalapril 20 mg PO BID, & hydralazine PRN    - Hold for SBP < 100mmHg or HR < 60bpm  - Lifestyle modifications (DASH diet, daily exercise encouraged, weight loss, limit ETOH intake, avoid NSAIDs)   - Pending TTE for evaluation of cardiac function, WMA, and any valvular abnormalities.   - MAP should not be lowered by more than 25 to 30% over the first several hours    Assessment and recommendations are final when note is signed by the attending. Continue to monitor on telemetry   - Troponin 34-->48, continue to trend; Nonischemic EKG SR @ 67bpm   - Pt follows with Dr. Jaden GOMEZ, recently changed home regimen   - Hydralazine 10 mg IVP x 1 and enalapril 20 mg PO x 1 administered per ED team with improvement   - Continue coreg 6.25 mg PO BID, enalapril 20 mg PO BID, & hydralazine PRN    - Hold for SBP < 100mmHg or HR < 60bpm  - Lifestyle modifications (DASH diet, daily exercise encouraged, weight loss, limit ETOH intake, avoid NSAIDs)   - Pending TTE for evaluation of cardiac function, WMA, and any valvular abnormalities.   - Maintain NPO status for further ischemic evaluation; plan for NST   - MAP should not be lowered by more than 25 to 30% over the first several hours    Assessment and recommendations are final when note is signed by the attending.

## 2025-04-01 NOTE — CONSULT NOTE ADULT - SUBJECTIVE AND OBJECTIVE BOX
[facility]                                              CARDIOLOGY AT [address], [place] [address]                                             Telephone: [phone]. Fax:[phone]                                                       CARDIOLOGY CONSULTATION NOTE                                                                  INCOMPLETE                           History obtained by: Patient and medical record  Community Cardiologist: Dr. Jaden MURGUIA   obtained: Yes [  ] No [ x ]  Reason for Consultation: HTN  Available out pt records reviewed: Yes [ x ] No [  ]    Chief complaint:    Patient is a 84y old  Male who presents with a chief complaint of     HPI:  84 y.o. male with PMhx of CAD w/ stents, HTN, HLD, prostate cancer s/p prostatectomy presents with elevated BP. Pt was evaluated by OP cardiologist, Dr. Jaden MURGUIA, about 1 month ago and states he was taken off of hydralazine and metoprolol. Pt was switched to coreg 6.25 mg BID and enalapril 20 mg BID. Pt states his BP reading showed SBP of 200 x 2. Pt states he does not check his BP regularly and that he has been compliant with his BP medication. Pt notes he was recently seen by neurologist for neuropathy and recently seen at  for rash and notes that his BP was also elevated during these visits. Pt spoke to his OP cardiologist who recommended he present to the ED for further evaluation. Upon arrival to ED, pt BP 200s/80s, asymptomatic, denies CP or SOB. Hydralazine 10 mg IVP x 1 and enalapril 20 mg PO x 1 administered per ED team with improvement. Denies chest pain, back pain, headache, dizziness, SOB, BYERS, diaphoresis, syncope or N/V.     CARDIAC TESTING   ECHO: Pending     STRESS:    CATH:     ELECTROPHYSIOLOGY:     PAST MEDICAL HISTORY  Stented coronary artery  CAD (coronary artery disease)  High cholesterol  Hypertension  Prostate ca      PAST SURGICAL HISTORY  S/P prostatectomy      SOCIAL HISTORY:    CIGARETTES:   Former smoker;  ALCOHOL:  DRUGS:    FAMILY HISTORY:  FH: HTN (hypertension)      Family History of Cardiovascular Disease:  Yes [ x ] No [  ]  Coronary Artery Disease in first degree relative: Yes [  ] No [  ]  Sudden Cardiac Death in First degree relative: Yes [  ] No [  ]    HOME MEDICATIONS:  aspirin 81 mg oral tablet: 1 tab(s) orally once a day (31 Mar 2025 12:52)  atorvastatin 10 mg oral tablet: 1 tab(s) orally once a day (31 Mar 2025 12:51)  carvedilol 6.25 mg oral tablet: 1 tab(s) orally 2 times a day (31 Mar 2025 12:51)  clopidogrel 75 mg oral tablet: 1 tab(s) orally once a day (31 Mar 2025 12:51)  enalapril 20 mg oral tablet: 1 tab(s) orally 2 times a day (31 Mar 2025 12:51)  gabapentin 100 mg oral capsule: 2 cap(s) orally 2 times a day (31 Mar 2025 12:51)  latanoprost 0.005% ophthalmic solution: 1 drop(s) in each eye once a day (at bedtime) (31 Mar 2025 12:52)  triamcinolone 0.5% topical ointment: Apply topically to affected area left outer calf 2 times daily (31 Mar 2025 12:51)      CURRENT CARDIAC MEDICATIONS:  carvedilol 6.25 milliGRAM(s) Oral every 12 hours  enalapril 20 milliGRAM(s) Oral two times a day  hydrALAZINE Injectable 10 milliGRAM(s) IV Push three times a day PRN hypertension      CURRENT OTHER MEDICATIONS:  gabapentin 200 milliGRAM(s) Oral two times a day  aspirin enteric coated 81 milliGRAM(s) Oral daily  atorvastatin 10 milliGRAM(s) Oral at bedtime  clopidogrel Tablet 75 milliGRAM(s) Oral daily  latanoprost 0.005% Ophthalmic Solution 1 Drop(s) Both EYES at bedtime      ALLERGIES:   No Known Allergies      REVIEW OF SYMPTOMS:   CONSTITUTIONAL: No fever, no chills, no weight loss, no weight gain, no fatigue   ENMT:  No vertigo; No sinus or throat pain  NECK: No pain or stiffness  CARDIOVASCULAR: No chest pain, no dyspnea, no syncope/presyncope, no palpitations, no dizziness, no orthopnea, no paroxsymal nocturnal dyspnea  RESPIRATORY: No shortness of breath, no cough, no wheezing  : No dysuria, no hematuria   GI: No dark color stool, no nausea, no diarrhea, no constipation, no abdominal pain   NEURO: No headache, no slurred speech   MUSCULOSKELETAL: No joint pain or swelling; No muscle, back, or extremity pain  PSYCH: No agitation, no anxiety  ALL OTHER REVIEW OF SYSTEMS ARE NEGATIVE.    VITAL SIGNS:  T(C): 36.6 (03-31-25 @ 23:25), Max: 36.9 (03-31-25 @ 15:44)  T(F): 97.8 (03-31-25 @ 23:25), Max: 98.4 (03-31-25 @ 15:44)  HR: 63 (03-31-25 @ 23:25) (52 - 69)  BP: 135/66 (03-31-25 @ 23:25) (135/66 - 238/89)  RR: 18 (03-31-25 @ 23:25) (18 - 20)  SpO2: 97% (03-31-25 @ 23:25) (97% - 99%)    INTAKE AND OUTPUT:       PHYSICAL EXAM:  Constitutional: Comfortable. No acute distress.   HEENT: Atraumatic and normocephalic, neck is supple. No JVD. No carotid bruit.  CNS: A&Ox3. No focal deficits.   Respiratory: CTAB, unlabored   Cardiovascular: RRR normal S1 S2. No murmur. No rubs or gallop.  Gastrointestinal: Soft, non-tender. +Bowel sounds.   Extremities: 2+ Peripheral Pulses, No clubbing, cyanosis, or edema  Psychiatric: Calm. No agitation.   Skin: Warm and dry, no ulcers on extremities     LABS:                      13.9   6.35  )-----------( 191      ( 31 Mar 2025 12:53 )             41.6     03-31    139  |  104  |  20.8[H]  ----------------------------<  111[H]  4.3   |  23.0  |  1.24    Ca    9.6      31 Mar 2025 15:28        Urinalysis Basic - ( 31 Mar 2025 15:28 )    Color: x / Appearance: x / SG: x / pH: x  Gluc: 111 mg/dL / Ketone: x  / Bili: x / Urobili: x   Blood: x / Protein: x / Nitrite: x   Leuk Esterase: x / RBC: x / WBC x   Sq Epi: x / Non Sq Epi: x / Bacteria: x      INTERPRETATION OF TELEMETRY: SR    ECG: SR @ 67bpm  Prior ECG: Yes [  ] No [ x ]    RADIOLOGY & ADDITIONAL STUDIES:   X-ray:    CT scan:   MRI:   US:                                              [facility]                                              CARDIOLOGY AT [address], [place] [address]                                             Telephone: [phone]. Fax:[phone]                                                       CARDIOLOGY CONSULTATION NOTE                                                                    History obtained by: Patient and medical record  Community Cardiologist: Dr. Jaden MURGUIA   obtained: Yes [  ] No [ x ]  Reason for Consultation: HTN  Available out pt records reviewed: Yes [ x ] No [  ]    Chief complaint:    Patient is a 84y old  Male who presents with a chief complaint of HTN    HPI:  84 y.o. male with PMhx of CAD w/ 3 stents, HTN, HLD, prostate cancer s/p prostatectomy presents with elevated BP. Pt was evaluated by OP cardiologist, Dr. Jaden MURGUIA, about 1 month ago and states he was taken off of hydralazine and metoprolol. Pt was switched to coreg 6.25 mg BID and enalapril 20 mg BID. Pt states his BP reading showed SBP of 200 x 2. Pt states he does not check his BP regularly and that he has been compliant with his BP medication. Pt notes he was recently seen by neurologist for neuropathy and recently seen at  for rash and notes that his BP was also elevated during these visits. Pt spoke to his OP cardiologist office and was recommended he present to the ED for further evaluation. Upon arrival to ED, pt BP 200s/80s, asymptomatic, denies CP or SOB. Hydralazine 10 mg IVP x 1 and enalapril 20 mg PO x 1 administered per ED team with improvement. Denies chest pain, back pain, headache, dizziness, SOB, BYERS, diaphoresis, syncope or N/V.     CARDIAC TESTING   ECHO: Pending     STRESS:    CATH:     ELECTROPHYSIOLOGY:     PAST MEDICAL HISTORY  Stented coronary artery  CAD (coronary artery disease)  High cholesterol  Hypertension  Prostate ca      PAST SURGICAL HISTORY  S/P prostatectomy      SOCIAL HISTORY:    CIGARETTES:   Former smoker; quit 15 years ago; prior 1 PPD x 15 years   ALCOHOL:     Socially   DRUGS:     Denies    FAMILY HISTORY:  FH: HTN (hypertension)  Mother MI, HTN  Father HF, HTN      Family History of Cardiovascular Disease:  Yes [ x ] No [  ]  Coronary Artery Disease in first degree relative: Yes [ x ] No [  ]  Sudden Cardiac Death in First degree relative: Yes [ x ] No [  ]    HOME MEDICATIONS:  aspirin 81 mg oral tablet: 1 tab(s) orally once a day (31 Mar 2025 12:52)  atorvastatin 10 mg oral tablet: 1 tab(s) orally once a day (31 Mar 2025 12:51)  carvedilol 6.25 mg oral tablet: 1 tab(s) orally 2 times a day (31 Mar 2025 12:51)  clopidogrel 75 mg oral tablet: 1 tab(s) orally once a day (31 Mar 2025 12:51)  enalapril 20 mg oral tablet: 1 tab(s) orally 2 times a day (31 Mar 2025 12:51)  gabapentin 100 mg oral capsule: 2 cap(s) orally 2 times a day (31 Mar 2025 12:51)  latanoprost 0.005% ophthalmic solution: 1 drop(s) in each eye once a day (at bedtime) (31 Mar 2025 12:52)  triamcinolone 0.5% topical ointment: Apply topically to affected area left outer calf 2 times daily (31 Mar 2025 12:51)      CURRENT CARDIAC MEDICATIONS:  carvedilol 6.25 milliGRAM(s) Oral every 12 hours  enalapril 20 milliGRAM(s) Oral two times a day  hydrALAZINE Injectable 10 milliGRAM(s) IV Push three times a day PRN hypertension      CURRENT OTHER MEDICATIONS:  gabapentin 200 milliGRAM(s) Oral two times a day  aspirin enteric coated 81 milliGRAM(s) Oral daily  atorvastatin 10 milliGRAM(s) Oral at bedtime  clopidogrel Tablet 75 milliGRAM(s) Oral daily  latanoprost 0.005% Ophthalmic Solution 1 Drop(s) Both EYES at bedtime      ALLERGIES:   No Known Allergies      REVIEW OF SYMPTOMS:   CONSTITUTIONAL: No fever, no chills, no weight loss, no weight gain, no fatigue   ENMT:  No vertigo; No sinus or throat pain  NECK: No pain or stiffness  CARDIOVASCULAR: No chest pain, no dyspnea, no syncope/presyncope, no palpitations, no dizziness, no orthopnea, no paroxsymal nocturnal dyspnea  RESPIRATORY: No shortness of breath, no cough, no wheezing  : No dysuria, no hematuria   GI: No dark color stool, no nausea, no diarrhea, no constipation, no abdominal pain   NEURO: No headache, no slurred speech   MUSCULOSKELETAL: No joint pain or swelling; No muscle, back, or extremity pain  PSYCH: No agitation, no anxiety  ALL OTHER REVIEW OF SYSTEMS ARE NEGATIVE.    VITAL SIGNS:  T(C): 36.6 (03-31-25 @ 23:25), Max: 36.9 (03-31-25 @ 15:44)  T(F): 97.8 (03-31-25 @ 23:25), Max: 98.4 (03-31-25 @ 15:44)  HR: 63 (03-31-25 @ 23:25) (52 - 69)  BP: 135/66 (03-31-25 @ 23:25) (135/66 - 238/89)  RR: 18 (03-31-25 @ 23:25) (18 - 20)  SpO2: 97% (03-31-25 @ 23:25) (97% - 99%)    INTAKE AND OUTPUT:       PHYSICAL EXAM:  Constitutional: Comfortable. No acute distress.   HEENT: Atraumatic and normocephalic, neck is supple. No JVD. No carotid bruit.  CNS: A&Ox3. No focal deficits.   Respiratory: CTAB, unlabored   Cardiovascular: RRR normal S1 S2. No murmur. No rubs or gallop.  Gastrointestinal: Soft, non-tender. +Bowel sounds.   Extremities: 2+ Peripheral Pulses, No clubbing, cyanosis, or edema  Psychiatric: Calm. No agitation.   Skin: Warm and dry, no ulcers on extremities     LABS:                      13.9   6.35  )-----------( 191      ( 31 Mar 2025 12:53 )             41.6     03-31    139  |  104  |  20.8[H]  ----------------------------<  111[H]  4.3   |  23.0  |  1.24    Ca    9.6      31 Mar 2025 15:28        Urinalysis Basic - ( 31 Mar 2025 15:28 )    Color: x / Appearance: x / SG: x / pH: x  Gluc: 111 mg/dL / Ketone: x  / Bili: x / Urobili: x   Blood: x / Protein: x / Nitrite: x   Leuk Esterase: x / RBC: x / WBC x   Sq Epi: x / Non Sq Epi: x / Bacteria: x      INTERPRETATION OF TELEMETRY: SR    ECG: SR @ 67bpm  Prior ECG: Yes [  ] No [ x ]    RADIOLOGY & ADDITIONAL STUDIES:   X-ray:    CT scan:   MRI:   US:

## 2025-04-01 NOTE — ED ADULT NURSE REASSESSMENT NOTE - NS ED NURSE REASSESS COMMENT FT1
assumed care for pt at 0730 from EN Sam, charting as noted. pt resting comfortably in stretcher, NSR/SB 50s-60s on telemetry. respirations even and unlabored. pt waiting to TTE & nuclear stress test, pt updated on plan of care & is in agreement. pt shows no s/s of acute distress at this time. safety precautions maintained.

## 2025-04-01 NOTE — ED CDU PROVIDER DISPOSITION NOTE - CARE PROVIDER_API CALL
Valentin Leonard  Cardiovascular Disease  [address] [facility], [address] [address]  Phone: [phone]  Fax: [phone]  Follow Up Time:

## 2025-04-01 NOTE — CHART NOTE - NSCHARTNOTEFT_GEN_A_CORE
CM met with patient and wife at the bedside. Pt A&O x4. Pt PTA IND. Pt has a follow up appt with cardio, neuro and PCP. Pt states he is complaint with his meds. DSP home with outpt f/u.

## 2025-04-01 NOTE — ED CDU PROVIDER SUBSEQUENT DAY NOTE - CLINICAL SUMMARY MEDICAL DECISION MAKING FREE TEXT BOX
85 yo male PMHx CAD w/stents, HTN, HLD, prostate cancer s/p prostatectomy presented to ED for asymptomatic elevated blood pressure reading. Antihypertensives recently changed one month ago by primary cardiologist. Trop 34>48. Pending repeat. Pending TTE and cardiology consult. Patient placed in OBS

## 2025-04-01 NOTE — ED CDU PROVIDER DISPOSITION NOTE - NSFOLLOWUPINSTRUCTIONS_ED_ALL_ED_FT
- Return to the ED for any new or worsening symptoms.   - Follow-up with your cardiologist as scheduled for further management of high blood pressure   - Continue current medications    Hypertension    Hypertension, commonly called high blood pressure, is when the force of blood pumping through your arteries is too strong. Hypertension forces your heart to work harder to pump blood. Your arteries may become narrow or stiff. Having untreated or uncontrolled hypertension for a long period of time can cause heart attack, stroke, kidney disease, and other problems. If started on a medication, take exactly as prescribed by your health care professional. Maintain a healthy lifestyle and follow up with your primary care physician.    SEEK IMMEDIATE MEDICAL CARE IF YOU HAVE ANY OF THE FOLLOWING SYMPTOMS: severe headache, confusion, chest pain, abdominal pain, vomiting, or shortness of breath.

## 2025-04-01 NOTE — ED ADULT NURSE REASSESSMENT NOTE - NS ED NURSE REASSESS COMMENT FT1
Assuming care from previous RN, pt AOx4, denies SOB, resp even and unlabored, skin warm and dry, color good, denies pain/n/v, patent 20G IV in left AC, showing NSR on monitor, pt awaiting NST and ECHO results, family @ bedside, aware of plan of care.

## 2025-04-01 NOTE — ED CDU PROVIDER DISPOSITION NOTE - CLINICAL COURSE
85yo M PMHx CAD w/stents, HTN, HLD, prostate cancer s/p prostatectomy presented to ED for asymptomatic elevated blood pressure reading. Antihypertensives recently changed one month ago by primary cardiologist. Trop stable (34>48>33>31). Cardiology consulted, recommended TTE and NST. Pt never had chest pain or SOB. TTE unremarkable, nl LV systolic fxn, EF 59%. NST normal, no ischemia. BP initially elevated 200s systolic in ED, improved after IV hydralazine and has been fairly well controlled throughout obs course on home medications. Cardiology cleared pt, recommending outpt f/u with pt's cardiologist for further management of HTN. Pt has appt in 3 days with cardiology. All results and plan reviewed with pt and family at bedside. Pt provided copy of all results. Return precautions discussed. Pt and family verbalized understanding of all results, treatment plan, follow-up instructions and reasons to return to ED.

## 2025-04-01 NOTE — ED ADULT NURSE REASSESSMENT NOTE - NS ED NURSE REASSESS COMMENT FT1
Pt admits to feeling better, AOx4, able to ambulate safely and steadily w/out assistance, denies dizziness/weakness upon standing, IV removed, pt d/c home, VSS, d/c paperwork provided w/ referral information. Pt admits to feeling better, AOx4, able to ambulate safely and steadily w/ assistance, denies dizziness/weakness upon standing, IV removed, pt d/c home, VSS, d/c paperwork provided w/ referral information, brought to ED exit via wheelchair. Pt admits to feeling better, AOx4, able to ambulate safely and steadily w/ out assistance, denies dizziness/weakness upon standing, IV removed, pt d/c home, VSS, d/c paperwork provided w/ referral information.

## 2025-04-01 NOTE — CONSULT NOTE ADULT - NS ATTEND AMEND GEN_ALL_CORE FT
84 year old male seen in evaluation for hypertension      hypertensive urgency    2d TTE:  CONCLUSIONS:      1. Left ventricular cavity is normal in size. Left ventricular systolic function is normal with an ejection fraction of 59 % by 3D.   2. Normal right ventricular cavity size and normal right ventricular systolic function.   3. Left atrium is mildly dilated.   4. Mild mitral regurgitation.   5. No pericardial effusion seen.   6. Mild aortic regurgitation.   7. No left ventricular hypertrophy.   8. No prior echocardiogram is available for comparison.      Preliminary NST: no ischemia, awaiting official report    adjustment of anti-hypertensives  cardiac enzymes negative    has appt with outpatient cardiology in 3 days    we will follow peripherally.

## 2025-04-01 NOTE — ED CDU PROVIDER SUBSEQUENT DAY NOTE - PROGRESS NOTE DETAILS
Pt evaluated on AM rounds, resting comfortably. Pending TTE, NST and further cardiology reccs. No chest pain, sob or headache.

## 2025-04-01 NOTE — ED CDU PROVIDER DISPOSITION NOTE - ATTENDING CONTRIBUTION TO CARE
Patient placed in observation for asymptomatic hypertension.  In observation.  Had echo which was nonactionable.  Nuclear stress test no evidence of infarction or inducible ischemia.  BP has been fairly well-controlled throughout observation course on home medications.  Cleared by cardiology with outpatient follow-up.

## 2025-04-01 NOTE — ED CDU PROVIDER DISPOSITION NOTE - PATIENT PORTAL LINK FT
You can access the FollowMyHealth Patient Portal offered by [facility] by registering at the following website: [email or web address]. By joining [employer]’s FollowMyHealth portal, you will also be able to view your health information using other applications (apps) compatible with our system.

## 2025-04-01 NOTE — ED CDU PROVIDER SUBSEQUENT DAY NOTE - ATTENDING APP SHARED VISIT CONTRIBUTION OF CARE
84-year-old placed in ED observation for asymptomatic hypertension.  Patient had medications changed by cardiologist approximately 1 month ago.  Patient had 2 episodes of elevated blood pressure to 170s over 70 while at urgent care for rash he developed to the left shin while cleaning the garden.  Was told by his primary cardiologist office to come to the ED because a documented blood pressure was 235 systolic at home.  Patient remains asymptomatic, currently blood pressure 163/74.  ED record reviewed.  Patient seen and evaluated by cardiology who is recommending TTE and NST.

## 2025-04-01 NOTE — CONSULT NOTE ADULT - ASSESSMENT
84 y.o. male with PMhx of CAD w/ stents, HTN, HLD, prostate cancer s/p prostatectomy presents with elevated BP. Pt was evaluated by OP cardiologist, Dr. Jaden MURGUIA, about 1 month ago and states he was taken off of hydralazine and metoprolol. Pt was switched to coreg 6.25 mg BID and enalapril 20 mg BID. Pt states his BP reading showed SBP of 200 x 2. Pt states he does not check his BP regularly and that he has been compliant with his BP medication. Pt notes he was recently seen by neurologist for neuropathy and recently seen at  for rash and notes that his BP was also elevated during these visits. Pt spoke to his OP cardiologist who recommended he present to the ED for further evaluation. Upon arrival to ED, pt BP 200s/80s, asymptomatic, denies CP or SOB. Hydralazine 10 mg IVP x 1 and enalapril 20 mg PO x 1 administered per ED team with improvement.  84 y.o. male with PMhx of CAD w/ 3 stents, HTN, HLD, prostate cancer s/p prostatectomy presents with elevated BP. Pt was evaluated by OP cardiologist, Dr. Jaden MURGUIA, about 1 month ago and states he was taken off of hydralazine and metoprolol. Pt was switched to coreg 6.25 mg BID and enalapril 20 mg BID. Pt states his BP reading showed SBP of 200 x 2. Pt states he does not check his BP regularly and that he has been compliant with his BP medication. Pt notes he was recently seen by neurologist for neuropathy and recently seen at  for rash and notes that his BP was also elevated during these visits. Pt spoke to his OP cardiologist office and was recommended he present to the ED for further evaluation. Upon arrival to ED, pt BP 200s/80s, asymptomatic, denies CP or SOB. Hydralazine 10 mg IVP x 1 and enalapril 20 mg PO x 1 administered per ED team with improvement.

## 2025-04-10 ENCOUNTER — NON-APPOINTMENT (OUTPATIENT)
Age: 85
End: 2025-04-10

## 2025-04-22 ENCOUNTER — APPOINTMENT (OUTPATIENT)
Dept: ORTHOPEDIC SURGERY | Facility: CLINIC | Age: 85
End: 2025-04-22

## 2025-04-22 VITALS — BODY MASS INDEX: 27.97 KG/M2 | HEIGHT: 66 IN | WEIGHT: 174 LBS

## 2025-04-22 DIAGNOSIS — M25.562 PAIN IN LEFT KNEE: ICD-10-CM

## 2025-04-22 DIAGNOSIS — M79.18 MYALGIA, OTHER SITE: ICD-10-CM

## 2025-04-22 DIAGNOSIS — M23.92 UNSPECIFIED INTERNAL DERANGEMENT OF LEFT KNEE: ICD-10-CM

## 2025-04-22 DIAGNOSIS — S89.92XD UNSPECIFIED INJURY OF LEFT LOWER LEG, SUBSEQUENT ENCOUNTER: ICD-10-CM

## 2025-04-22 PROCEDURE — 20611 DRAIN/INJ JOINT/BURSA W/US: CPT | Mod: LT

## 2025-04-22 PROCEDURE — 99214 OFFICE O/P EST MOD 30 MIN: CPT | Mod: 25

## 2025-04-23 NOTE — ADDENDUM
[FreeTextEntry1] : Documented by Phil Laura acting as a scribe for Dr. Wells and Juan Rodriges PA-C on 04/22/2025 and was presence for the following sections: Physical Exam; Data Reviewed; Assessment; Discussion/Summary. All medical record entries made by the Scribe were at my, Dr. Wells, and Juan Rodriges, direction and personally dictated by me on 04/22/2025. I have reviewed the chart and agree that the record accurately reflects my personal performance of the history, physical exam, procedure and imaging.

## 2025-04-23 NOTE — DISCUSSION/SUMMARY
[de-identified] : Patient experiencing pain during today's visit. Discussed treatment options, the patient would like to follow through with left knee CSI injection. Patient will follow up as needed.     RB&A to corticosteroid injection discussed. All questions were answered. Patient wishes to move forward with injection today.     Left Knee Ultrasound Guided aspiration/steroid injection procedure note: Patient Identification Name/: Verbal with patient and/or family Procedure Verification: Procedure confirmed with patient or family/designee Consent for procedure: Verbal Consent Given Relevant documentation completed, reviewed, and signed Clinical indications for procedure confirmed Time-out with all members of procedure team immediately prior to procedure: Correct patient identified. Agreement on procedure. Correct side and site.   KNEE INTRAARTICULAR INJECTION - LEFT After verbal consent and identification of the correct patient and correct site, the LEFT superolateral knee was prepped using alcohol swabs and betadine. This was allowed time to air dry. A mixture of Kenalog, Bupivacaine was injected into the left knee using a sterile 22G 1.5-inch needle. The patient tolerated the procedure well. A sterile dressing was placed. After-care instructions were provided and included instructions to ice the area and to call if redness, pain, or fever develop.     ----------------------------------------------- Home Exercise The patient is instructed on a home exercise program.   CESAR ECHAVARRIA Acting as a Scribe for Dr. Priscilla WATKINS, Cesar Echavarria, attest that this documentation has been prepared under the direction and in the presence of Provider Dr. Wells.   Activity Modification The patient was advised to modify their activities.   Dx / Natural History The patient was advised of the diagnosis. The natural history of the pathology was explained in full to the patient in layman's terms. Several different treatment options were discussed and explained in full to the patient including the risks and benefits of both surgical and non-surgical treatments.  All questions and concerns were answered.   Pain Guide Activities The patient was advised to let pain guide the gradual advancement of activities.   TIFFANIE WATKINS explained to the patient that rest, ice, compression, and elevation would benefit them. They may return to activity after follow-up or when they no longer have any pain.   The patient's current medication management of their orthopedic diagnosis was reviewed today: (1) We discussed a comprehensive treatment plan that included possible pharmaceutical management involving the use of prescription strength medications including but not limited to options such as oral Naprosyn 500mg BID, once daily Meloxicam 15 mg, or 500-650 mg Tylenol versus over the counter oral medications and topical prescription NSAID Pennsaid vs over the counter Voltaren gel. (2) There is a moderate risk of morbidity with further treatment, especially from use of prescription strength medications and possible side effects of these medications which include upset stomach with oral medications, skin reactions to topical medications and cardiac/renal issues with long term use. (3) I recommended that the patient follow-up with their medical physician to discuss any significant specific potential issues with long term medication use such as interactions with current medications or with exacerbation of underlying medical comorbidities. (4) The benefits and risks associated with use of injectable, oral or topical, prescription and over the counter anti-inflammatory medications were discussed with the patient. The patient voiced understanding of the risks including but not limited to bleeding, stroke, kidney dysfunction, heart disease, and were referred to the black box warning label for further information.

## 2025-04-23 NOTE — HISTORY OF PRESENT ILLNESS
[de-identified] : The patient is a 82 year old [LEFT] hand dominant male who presents today complaining of left knee.   Date of Injury/Onset: 8/2022, flared up 2 weeks ago  Pain:    At Rest: 7/10  With Activity:  710  Mechanism of injury: insidious  This is NOT a Work Related Injury being treated under Worker's Compensation. This is NOT an athletic injury occurring associated with an interscholastic or organized sports team. Quality of symptoms: aching  Improves with: CSI, stretching  Worse with: activity, bending  Treatment/Imaging/Studies Since Last Visit: CSI - 4/24/24 	Reports Available For Review Today: n/a Out of work/sport: _, since _ School/Sport/Position/Occupation: retired   Change since last visit: patient states the CSI helped in April of 2024, but pain started coming back ~ 2 weeks ago  Additional Information:  hx of Patellar bone spurs, PF OA

## 2025-04-23 NOTE — PHYSICAL EXAM
[de-identified] : Neurovascularly intact distally   Left Knee:  No effusion Medial joint line tenderness Medial facet of patella tenderness Positive Abiel's test Complete tear to lateral aspect of quadriceps tendon noted

## 2025-04-23 NOTE — HISTORY OF PRESENT ILLNESS
[de-identified] : The patient is a 82 year old [LEFT] hand dominant male who presents today complaining of left knee.   Date of Injury/Onset: 8/2022, flared up 2 weeks ago  Pain:    At Rest: 7/10  With Activity:  710  Mechanism of injury: insidious  This is NOT a Work Related Injury being treated under Worker's Compensation. This is NOT an athletic injury occurring associated with an interscholastic or organized sports team. Quality of symptoms: aching  Improves with: CSI, stretching  Worse with: activity, bending  Treatment/Imaging/Studies Since Last Visit: CSI - 4/24/24 	Reports Available For Review Today: n/a Out of work/sport: _, since _ School/Sport/Position/Occupation: retired   Change since last visit: patient states the CSI helped in April of 2024, but pain started coming back ~ 2 weeks ago  Additional Information:  hx of Patellar bone spurs, PF OA

## 2025-04-23 NOTE — PHYSICAL EXAM
[de-identified] : Neurovascularly intact distally   Left Knee:  No effusion Medial joint line tenderness Medial facet of patella tenderness Positive Abiel's test Complete tear to lateral aspect of quadriceps tendon noted

## 2025-04-23 NOTE — DISCUSSION/SUMMARY
[de-identified] : Patient experiencing pain during today's visit. Discussed treatment options, the patient would like to follow through with left knee CSI injection. Patient will follow up as needed.     RB&A to corticosteroid injection discussed. All questions were answered. Patient wishes to move forward with injection today.     Left Knee Ultrasound Guided aspiration/steroid injection procedure note: Patient Identification Name/: Verbal with patient and/or family Procedure Verification: Procedure confirmed with patient or family/designee Consent for procedure: Verbal Consent Given Relevant documentation completed, reviewed, and signed Clinical indications for procedure confirmed Time-out with all members of procedure team immediately prior to procedure: Correct patient identified. Agreement on procedure. Correct side and site.   KNEE INTRAARTICULAR INJECTION - LEFT After verbal consent and identification of the correct patient and correct site, the LEFT superolateral knee was prepped using alcohol swabs and betadine. This was allowed time to air dry. A mixture of Kenalog, Bupivacaine was injected into the left knee using a sterile 22G 1.5-inch needle. The patient tolerated the procedure well. A sterile dressing was placed. After-care instructions were provided and included instructions to ice the area and to call if redness, pain, or fever develop.     ----------------------------------------------- Home Exercise The patient is instructed on a home exercise program.   CESAR ECHAVARRIA Acting as a Scribe for Dr. Priscilla WATKINS, Cesar Echavarria, attest that this documentation has been prepared under the direction and in the presence of Provider Dr. Wells.   Activity Modification The patient was advised to modify their activities.   Dx / Natural History The patient was advised of the diagnosis. The natural history of the pathology was explained in full to the patient in layman's terms. Several different treatment options were discussed and explained in full to the patient including the risks and benefits of both surgical and non-surgical treatments.  All questions and concerns were answered.   Pain Guide Activities The patient was advised to let pain guide the gradual advancement of activities.   TIFFANIE WATKINS explained to the patient that rest, ice, compression, and elevation would benefit them. They may return to activity after follow-up or when they no longer have any pain.   The patient's current medication management of their orthopedic diagnosis was reviewed today: (1) We discussed a comprehensive treatment plan that included possible pharmaceutical management involving the use of prescription strength medications including but not limited to options such as oral Naprosyn 500mg BID, once daily Meloxicam 15 mg, or 500-650 mg Tylenol versus over the counter oral medications and topical prescription NSAID Pennsaid vs over the counter Voltaren gel. (2) There is a moderate risk of morbidity with further treatment, especially from use of prescription strength medications and possible side effects of these medications which include upset stomach with oral medications, skin reactions to topical medications and cardiac/renal issues with long term use. (3) I recommended that the patient follow-up with their medical physician to discuss any significant specific potential issues with long term medication use such as interactions with current medications or with exacerbation of underlying medical comorbidities. (4) The benefits and risks associated with use of injectable, oral or topical, prescription and over the counter anti-inflammatory medications were discussed with the patient. The patient voiced understanding of the risks including but not limited to bleeding, stroke, kidney dysfunction, heart disease, and were referred to the black box warning label for further information.

## 2025-04-30 ENCOUNTER — APPOINTMENT (OUTPATIENT)
Dept: NEUROLOGY | Facility: CLINIC | Age: 85
End: 2025-04-30
Payer: MEDICARE

## 2025-04-30 PROCEDURE — 99213 OFFICE O/P EST LOW 20 MIN: CPT

## 2025-04-30 PROCEDURE — 95912 NRV CNDJ TEST 11-12 STUDIES: CPT

## 2025-04-30 PROCEDURE — 95885 MUSC TST DONE W/NERV TST LIM: CPT

## 2025-04-30 NOTE — PROCEDURE
[FreeTextEntry1] : Electromyography nerve conduction study of the upper and lower extremity demonstrates evidence of severe bilateral motor sensory median nerve entrapment neuropathy at the wrist consistent with a clinical diagnosis of carpal tunnel syndrome. At this time no electrodiagnostic evidence of an underlying peripheral neuropathy.

## 2025-04-30 NOTE — HISTORY OF PRESENT ILLNESS
[FreeTextEntry1] : 84-year-old man right-handed here for follow-up visit, last time seen in March 2027, for complaints of numbness, tingling of the hands and feet, occasionally burning sensations in the feet.  I am the worst when he is walking or standing for a long time. Evaluation so far included a blood work, showed a low B12, and a methylmalonic acid which was elevated consistent with B12 deficiency.

## 2025-04-30 NOTE — PHYSICAL EXAM
[General Appearance - Alert] : alert [General Appearance - In No Acute Distress] : in no acute distress [Person] : oriented to person [Place] : oriented to place [Time] : oriented to time [Cranial Nerves Optic (II)] : visual acuity intact bilaterally,  visual fields full to confrontation, pupils equal round and reactive to light [Cranial Nerves Oculomotor (III)] : extraocular motion intact [Cranial Nerves Trigeminal (V)] : facial sensation intact symmetrically [Cranial Nerves Facial (VII)] : face symmetrical [Cranial Nerves Vestibulocochlear (VIII)] : hearing was intact bilaterally [Cranial Nerves Glossopharyngeal (IX)] : tongue and palate midline [Cranial Nerves Accessory (XI - Cranial And Spinal)] : head turning and shoulder shrug symmetric [Cranial Nerves Hypoglossal (XII)] : there was no tongue deviation with protrusion [Motor Tone] : muscle tone was normal in all four extremities [Motor Strength] : muscle strength was normal in all four extremities [No Muscle Atrophy] : normal bulk in all four extremities [Motor Handedness Left-Handed] : the patient is left hand dominant [Sensation Tactile Decrease] : light touch was intact [Proprioception] : proprioception was intact [Pain / Temp Decrease Knee And Medial Leg (L4) - Right Only] : normal right knee and medial leg (L4) [Pain / Temp Decrease Knee And Medial Leg (L4) - Left Only] : normal left knee and medial leg (L4) [Pain / Temp Decrease Lateral Leg & Dorsum Of Foot Right Only] : diminished right lateral leg and dorsum of the foot (L5) [Pain / Temp Decrease Lateral Leg & Dorsum Of Foot Left Only] : diminished left lateral leg and dorsum of the foot (L5) [Pain / Temp Decrease Sole Of Foot & Posterior Leg (S1) Rt.] : normal right sole of the foot and posterior leg (S1) [Pain / Temp Decrease Sole Of Foot & Posterior Leg Left Only] : normal left sole of the foot and posterior leg (S1) [Vibration Decrease Arm / Hand Bilateral] : normal in both arms and hands [Vibration Decrease Leg / Foot Toes Both Feet] : decreased at the toes of both feet  [Abnormal Walk] : normal gait [Balance] : balance was intact [Past-pointing] : there was no past-pointing [Tremor] : no tremor present [Dysdiadochokinesia Bilaterally] : not present [Coordination - Dysmetria Impaired Finger-to-Nose Bilateral] : not present [2+] : Ankle jerk left 2+ [Plantar Reflex Right Only] : normal on the right [Plantar Reflex Left Only] : normal on the left

## 2025-04-30 NOTE — DISCUSSION/SUMMARY
[FreeTextEntry1] : 84-year-old man with complaints of numbness and tingling of the extremities, electrodiagnostic study consistent with bilateral severe carpal tunnel syndrome.  No evidence of underlying neuropathy. Reviewed and discussed study findings, advised to wear nighttime wrist brace. Discussed possible treatment options for carpal tunnel syndrome. If symptoms worsen or persist consider a carpal tunnel steroid injection, referral to the hand clinic. Advise continuation with B12 supplementation IM, patient will follow-up with primary care.  Will follow-up studies in 3 to 4 months. Return to office, 6 months.

## 2025-05-16 ENCOUNTER — NON-APPOINTMENT (OUTPATIENT)
Age: 85
End: 2025-05-16

## 2025-05-17 DIAGNOSIS — E53.8 DEFICIENCY OF OTHER SPECIFIED B GROUP VITAMINS: ICD-10-CM

## 2025-05-17 RX ORDER — CYANOCOBALAMIN 1000 UG/ML
1000 INJECTION, SOLUTION INTRAMUSCULAR; SUBCUTANEOUS
Qty: 1 | Refills: 3 | Status: ACTIVE | COMMUNITY
Start: 2025-05-17 | End: 1900-01-01

## 2025-05-20 ENCOUNTER — NON-APPOINTMENT (OUTPATIENT)
Age: 85
End: 2025-05-20

## 2025-05-30 ENCOUNTER — APPOINTMENT (OUTPATIENT)
Dept: MRI IMAGING | Facility: CLINIC | Age: 85
End: 2025-05-30
Payer: MEDICARE

## 2025-05-30 ENCOUNTER — APPOINTMENT (OUTPATIENT)
Dept: ORTHOPEDIC SURGERY | Facility: CLINIC | Age: 85
End: 2025-05-30
Payer: MEDICARE

## 2025-05-30 VITALS — HEIGHT: 66 IN | BODY MASS INDEX: 27.97 KG/M2 | WEIGHT: 174 LBS

## 2025-05-30 PROCEDURE — 73721 MRI JNT OF LWR EXTRE W/O DYE: CPT | Mod: LT

## 2025-05-30 PROCEDURE — 99214 OFFICE O/P EST MOD 30 MIN: CPT

## 2025-05-30 RX ORDER — METHYLPREDNISOLONE 4 MG/1
4 TABLET ORAL
Qty: 21 | Refills: 0 | Status: ACTIVE | COMMUNITY
Start: 2025-05-30 | End: 1900-01-01

## 2025-06-01 NOTE — PHYSICAL EXAM
[de-identified] : Neurovascularly intact distally   Left Knee:  Lateral joint line tenderness Lateral facet of patella tenderness Lateral Abiel's test positive

## 2025-06-01 NOTE — DISCUSSION/SUMMARY
[de-identified] : Patient experiencing pain during today's visit. Today MRI of the left knee to eval for LMT Rx MDP Follow up after scan.    **cannot take nsaids.     ----------------------------------------------- Home Exercise The patient is instructed on a home exercise program.  CORNELIUS DALE Acting as a Scribe for Dr. Priscilla WATKINS, Cornelius Dale, attest that this documentation has been prepared under the direction and in the presence of Provider Fidel Wells MD.  Activity Modification The patient was advised to modify their activities.  Dx / Natural History The patient was advised of the diagnosis.  The natural history of the pathology was explained in full to the patient in layman's terms.  Several different treatment options were discussed and explained in full to the patient including the risks and benefits of both surgical and non-surgical treatments.  All questions and concerns were answered.  Pain Guide Activities The patient was advised to let pain guide the gradual advancement of activities.  TIFFANIE WATKINS explained to the patient that rest, ice, compression, and elevation would benefit them.  They may return to activity after follow-up or when they no longer have any pain.  The patient's current medication management of their orthopedic diagnosis was reviewed today: (1) We discussed a comprehensive treatment plan that included possible pharmaceutical management involving the use of prescription strength medications including but not limited to options such as oral Naprosyn 500mg BID, once daily Meloxicam 15 mg, or 500-650 mg Tylenol versus over the counter oral medications and topical prescription NSAID Pennsaid vs over the counter Voltaren gel. (2) There is a moderate risk of morbidity with further treatment, especially from use of prescription strength medications and possible side effects of these medications which include upset stomach with oral medications, skin reactions to topical medications and cardiac/renal issues with long term use. (3) I recommended that the patient follow-up with their medical physician to discuss any significant specific potential issues with long term medication use such as interactions with current medications or with exacerbation of underlying medical comorbidities. (4) The benefits and risks associated with use of injectable, oral or topical, prescription and over the counter anti-inflammatory medications were discussed with the patient. The patient voiced understanding of the risks including but not limited to bleeding, stroke, kidney dysfunction, heart disease, and were referred to the black box warning label for further information.

## 2025-06-01 NOTE — PHYSICAL EXAM
[de-identified] : Neurovascularly intact distally   Left Knee:  Lateral joint line tenderness Lateral facet of patella tenderness Lateral Abiel's test positive

## 2025-06-01 NOTE — DATA REVIEWED
[FreeTextEntry1] :  4/24/24 OC X RAY left Knee; 4 view: This scan was reviewed and interpreted by Dr. Wells, and his findings are- Mild OA.     12/16/24 OC X-Ray Examination of the RIGHT KNEE: 4 views: Grade 3 Tri comp OA, possible bony evulsion fracture at tibial tubercle   12/16/24 OC MRI Right Knee: This scan was reviewed and interpreted by Dr. Wells, and his findings are- Impression: 1. Moderate partial tearing involving central mid to distal patella tendon fibers ventrally over an area measuring 2 cm where there is severe inflammatory change and bursitis without full-thickness patella tendon discontinuity or malalignment of the patella. Clinical correlation regarding any trauma or inflammatory arthropathy is recommended. Soft tissue infection should be ruled out clinically. 3. Degenerative changes throughout the right knee with complex degenerative medial meniscal tearing, anterior lateral meniscal tear, chronic ligament sprains and laxity, extensor mechanism tendinopathy, effusion and synovitis, multiple plicae, and nonspecific moderate soft tissue swelling surrounding the knee greatest anteriorly without acute fracture. 4. Clinical correlation and follow up is recommended.

## 2025-06-01 NOTE — HISTORY OF PRESENT ILLNESS
[de-identified] : The patient is a 82 year old [LEFT] hand dominant male who presents today complaining of left knee.   Date of Injury/Onset: 8/2022, flared up 2 weeks ago  Pain:    At Rest: 3/10  With Activity:  8/10  Mechanism of injury: insidious  This is NOT a Work Related Injury being treated under Worker's Compensation. This is NOT an athletic injury occurring associated with an interscholastic or organized sports team. Quality of symptoms: aching  Improves with: CSI, stretching  Worse with: activity, bending  Treatment/Imaging/Studies Since Last Visit: CSI - 4/24/24 	Reports Available For Review Today: n/a Out of work/sport: _, since _ School/Sport/Position/Occupation: retired   Change since last visit: patient states the CSI did not help this time, now pain under the knee he will have pain when bending or turning it in and out Additional Information:  hx of Patellar bone spurs, PF OA

## 2025-06-01 NOTE — DISCUSSION/SUMMARY
[de-identified] : Patient experiencing pain during today's visit. Today MRI of the left knee to eval for LMT Rx MDP Follow up after scan.    **cannot take nsaids.     ----------------------------------------------- Home Exercise The patient is instructed on a home exercise program.  CORNELIUS DALE Acting as a Scribe for Dr. Priscilla WATKINS, Cornelius Dale, attest that this documentation has been prepared under the direction and in the presence of Provider Fidel Wells MD.  Activity Modification The patient was advised to modify their activities.  Dx / Natural History The patient was advised of the diagnosis.  The natural history of the pathology was explained in full to the patient in layman's terms.  Several different treatment options were discussed and explained in full to the patient including the risks and benefits of both surgical and non-surgical treatments.  All questions and concerns were answered.  Pain Guide Activities The patient was advised to let pain guide the gradual advancement of activities.  TIFFANIE WATKINS explained to the patient that rest, ice, compression, and elevation would benefit them.  They may return to activity after follow-up or when they no longer have any pain.  The patient's current medication management of their orthopedic diagnosis was reviewed today: (1) We discussed a comprehensive treatment plan that included possible pharmaceutical management involving the use of prescription strength medications including but not limited to options such as oral Naprosyn 500mg BID, once daily Meloxicam 15 mg, or 500-650 mg Tylenol versus over the counter oral medications and topical prescription NSAID Pennsaid vs over the counter Voltaren gel. (2) There is a moderate risk of morbidity with further treatment, especially from use of prescription strength medications and possible side effects of these medications which include upset stomach with oral medications, skin reactions to topical medications and cardiac/renal issues with long term use. (3) I recommended that the patient follow-up with their medical physician to discuss any significant specific potential issues with long term medication use such as interactions with current medications or with exacerbation of underlying medical comorbidities. (4) The benefits and risks associated with use of injectable, oral or topical, prescription and over the counter anti-inflammatory medications were discussed with the patient. The patient voiced understanding of the risks including but not limited to bleeding, stroke, kidney dysfunction, heart disease, and were referred to the black box warning label for further information.

## 2025-06-01 NOTE — HISTORY OF PRESENT ILLNESS
[de-identified] : The patient is a 82 year old [LEFT] hand dominant male who presents today complaining of left knee.   Date of Injury/Onset: 8/2022, flared up 2 weeks ago  Pain:    At Rest: 3/10  With Activity:  8/10  Mechanism of injury: insidious  This is NOT a Work Related Injury being treated under Worker's Compensation. This is NOT an athletic injury occurring associated with an interscholastic or organized sports team. Quality of symptoms: aching  Improves with: CSI, stretching  Worse with: activity, bending  Treatment/Imaging/Studies Since Last Visit: CSI - 4/24/24 	Reports Available For Review Today: n/a Out of work/sport: _, since _ School/Sport/Position/Occupation: retired   Change since last visit: patient states the CSI did not help this time, now pain under the knee he will have pain when bending or turning it in and out Additional Information:  hx of Patellar bone spurs, PF OA

## 2025-06-02 ENCOUNTER — APPOINTMENT (OUTPATIENT)
Dept: ORTHOPEDIC SURGERY | Facility: CLINIC | Age: 85
End: 2025-06-02
Payer: MEDICARE

## 2025-06-02 VITALS — BODY MASS INDEX: 27.97 KG/M2 | HEIGHT: 66 IN | WEIGHT: 174 LBS

## 2025-06-02 DIAGNOSIS — M25.562 PAIN IN LEFT KNEE: ICD-10-CM

## 2025-06-02 PROBLEM — M23.92 INTERNAL DERANGEMENT OF LEFT KNEE: Status: ACTIVE | Noted: 2025-06-02

## 2025-06-02 PROCEDURE — 99214 OFFICE O/P EST MOD 30 MIN: CPT

## 2025-06-02 NOTE — DISCUSSION/SUMMARY
[de-identified] :  Reviewed all MRI images with patient, interpretation was provided. Discussed all treatment options with the patient, including medication, physical therapy, bracing, injections (steroid, PRP), and surgery. Reviewed risks, benefits, and outcomes of each. Physical therapy prescribed for strengthening and stretching. continue using the knee brace Patient will follow up in 6 weeks.   **cannot take nsaids.     ----------------------------------------------- Home Exercise The patient is instructed on a home exercise program.  CORNELIUS DALE Acting as a Scribe for Dr. Priscilla WATKINS, Cornelius Dale, attest that this documentation has been prepared under the direction and in the presence of Provider Fidel Wells MD.  Activity Modification The patient was advised to modify their activities.  Dx / Natural History The patient was advised of the diagnosis.  The natural history of the pathology was explained in full to the patient in layman's terms.  Several different treatment options were discussed and explained in full to the patient including the risks and benefits of both surgical and non-surgical treatments.  All questions and concerns were answered.  Pain Guide Activities The patient was advised to let pain guide the gradual advancement of activities.  RICE I explained to the patient that rest, ice, compression, and elevation would benefit them.  They may return to activity after follow-up or when they no longer have any pain.  The patient's current medication management of their orthopedic diagnosis was reviewed today: (1) We discussed a comprehensive treatment plan that included possible pharmaceutical management involving the use of prescription strength medications including but not limited to options such as oral Naprosyn 500mg BID, once daily Meloxicam 15 mg, or 500-650 mg Tylenol versus over the counter oral medications and topical prescription NSAID Pennsaid vs over the counter Voltaren gel. (2) There is a moderate risk of morbidity with further treatment, especially from use of prescription strength medications and possible side effects of these medications which include upset stomach with oral medications, skin reactions to topical medications and cardiac/renal issues with long term use. (3) I recommended that the patient follow-up with their medical physician to discuss any significant specific potential issues with long term medication use such as interactions with current medications or with exacerbation of underlying medical comorbidities. (4) The benefits and risks associated with use of injectable, oral or topical, prescription and over the counter anti-inflammatory medications were discussed with the patient. The patient voiced understanding of the risks including but not limited to bleeding, stroke, kidney dysfunction, heart disease, and were referred to the black box warning label for further information.

## 2025-06-02 NOTE — DATA REVIEWED
[FreeTextEntry1] : 05/30/25 OC MRI Left Knee: 1. Complex tear of the medial meniscus involving the posterior horn and body segment. 2. Degeneration and partial tearing of the popliteus tendon at its posterior lateral femoral condyle origin with mild adjacent reactive marrow edema. There is mild adjacent posterolateral corner soft tissue edema. 3. Mild to moderate quadriceps and patellar tendinosis without tear. 4. Small to moderate knee effusion.     4/24/24 OC X RAY left Knee; 4 view: This scan was reviewed and interpreted by Dr. Wells, and his findings are- Mild OA.     12/16/24 OC X-Ray Examination of the RIGHT KNEE: 4 views: Grade 3 Tri comp OA, possible bony evulsion fracture at tibial tubercle   12/16/24 OC MRI Right Knee: This scan was reviewed and interpreted by Dr. Wells, and his findings are- Impression: 1. Moderate partial tearing involving central mid to distal patella tendon fibers ventrally over an area measuring 2 cm where there is severe inflammatory change and bursitis without full-thickness patella tendon discontinuity or malalignment of the patella. Clinical correlation regarding any trauma or inflammatory arthropathy is recommended. Soft tissue infection should be ruled out clinically. 3. Degenerative changes throughout the right knee with complex degenerative medial meniscal tearing, anterior lateral meniscal tear, chronic ligament sprains and laxity, extensor mechanism tendinopathy, effusion and synovitis, multiple plicae, and nonspecific moderate soft tissue swelling surrounding the knee greatest anteriorly without acute fracture. 4. Clinical correlation and follow up is recommended.

## 2025-06-02 NOTE — HISTORY OF PRESENT ILLNESS
[de-identified] : The patient is a 82 year old [LEFT] hand dominant male who presents today complaining of left knee.   Date of Injury/Onset: 8/2022, flared up 2 weeks ago  Pain:    At Rest: 0/10  With Activity:  6/10  Mechanism of injury: insidious  This is NOT a Work Related Injury being treated under Worker's Compensation. This is NOT an athletic injury occurring associated with an interscholastic or organized sports team. Quality of symptoms: aching  Improves with: CSI, stretching  Worse with: activity, bending  Treatment/Imaging/Studies Since Last Visit: CSI - 4/24/24 	Reports Available For Review Today: MRI @ [facility] 5/30/25 Out of work/sport: retired School/Sport/Position/Occupation: retired   Change since last visit: Additional Information:  hx of Patellar bone spurs, PF OA

## 2025-06-02 NOTE — PHYSICAL EXAM
[de-identified] : Neurovascularly intact distally   Left Knee:  Lateral joint line tenderness Lateral facet of patella tenderness Lateral Abiel's test positive

## 2025-06-02 NOTE — HISTORY OF PRESENT ILLNESS
[de-identified] : The patient is a 82 year old [LEFT] hand dominant male who presents today complaining of left knee.   Date of Injury/Onset: 8/2022, flared up 2 weeks ago  Pain:    At Rest: 0/10  With Activity:  6/10  Mechanism of injury: insidious  This is NOT a Work Related Injury being treated under Worker's Compensation. This is NOT an athletic injury occurring associated with an interscholastic or organized sports team. Quality of symptoms: aching  Improves with: CSI, stretching  Worse with: activity, bending  Treatment/Imaging/Studies Since Last Visit: CSI - 4/24/24 	Reports Available For Review Today: MRI @ [facility] 5/30/25 Out of work/sport: retired School/Sport/Position/Occupation: retired   Change since last visit: Additional Information:  hx of Patellar bone spurs, PF OA

## 2025-06-02 NOTE — DISCUSSION/SUMMARY
[de-identified] :  Reviewed all MRI images with patient, interpretation was provided. Discussed all treatment options with the patient, including medication, physical therapy, bracing, injections (steroid, PRP), and surgery. Reviewed risks, benefits, and outcomes of each. Physical therapy prescribed for strengthening and stretching. continue using the knee brace Patient will follow up in 6 weeks.   **cannot take nsaids.     ----------------------------------------------- Home Exercise The patient is instructed on a home exercise program.  CORNELIUS DALE Acting as a Scribe for Dr. Priscilla WATKINS, Cornelius Dale, attest that this documentation has been prepared under the direction and in the presence of Provider Fidel Wells MD.  Activity Modification The patient was advised to modify their activities.  Dx / Natural History The patient was advised of the diagnosis.  The natural history of the pathology was explained in full to the patient in layman's terms.  Several different treatment options were discussed and explained in full to the patient including the risks and benefits of both surgical and non-surgical treatments.  All questions and concerns were answered.  Pain Guide Activities The patient was advised to let pain guide the gradual advancement of activities.  RICE I explained to the patient that rest, ice, compression, and elevation would benefit them.  They may return to activity after follow-up or when they no longer have any pain.  The patient's current medication management of their orthopedic diagnosis was reviewed today: (1) We discussed a comprehensive treatment plan that included possible pharmaceutical management involving the use of prescription strength medications including but not limited to options such as oral Naprosyn 500mg BID, once daily Meloxicam 15 mg, or 500-650 mg Tylenol versus over the counter oral medications and topical prescription NSAID Pennsaid vs over the counter Voltaren gel. (2) There is a moderate risk of morbidity with further treatment, especially from use of prescription strength medications and possible side effects of these medications which include upset stomach with oral medications, skin reactions to topical medications and cardiac/renal issues with long term use. (3) I recommended that the patient follow-up with their medical physician to discuss any significant specific potential issues with long term medication use such as interactions with current medications or with exacerbation of underlying medical comorbidities. (4) The benefits and risks associated with use of injectable, oral or topical, prescription and over the counter anti-inflammatory medications were discussed with the patient. The patient voiced understanding of the risks including but not limited to bleeding, stroke, kidney dysfunction, heart disease, and were referred to the black box warning label for further information.

## 2025-06-02 NOTE — PHYSICAL EXAM
[de-identified] : Neurovascularly intact distally   Left Knee:  Lateral joint line tenderness Lateral facet of patella tenderness Lateral Abiel's test positive

## 2025-06-09 ENCOUNTER — RX ONLY (RX ONLY)
Age: 85
End: 2025-06-09

## 2025-06-09 ENCOUNTER — OFFICE (OUTPATIENT)
Dept: URBAN - METROPOLITAN AREA CLINIC 112 | Facility: CLINIC | Age: 85
Setting detail: OPHTHALMOLOGY
End: 2025-06-09
Payer: COMMERCIAL

## 2025-06-09 ENCOUNTER — APPOINTMENT (OUTPATIENT)
Dept: ORTHOPEDIC SURGERY | Facility: CLINIC | Age: 85
End: 2025-06-09

## 2025-06-09 DIAGNOSIS — H35.033: ICD-10-CM

## 2025-06-09 DIAGNOSIS — H25.13: ICD-10-CM

## 2025-06-09 DIAGNOSIS — H40.1132: ICD-10-CM

## 2025-06-09 PROCEDURE — 92250 FUNDUS PHOTOGRAPHY W/I&R: CPT | Performed by: OPHTHALMOLOGY

## 2025-06-09 PROCEDURE — 92004 COMPRE OPH EXAM NEW PT 1/>: CPT | Performed by: OPHTHALMOLOGY

## 2025-06-09 PROCEDURE — 20611 DRAIN/INJ JOINT/BURSA W/US: CPT | Mod: LT

## 2025-06-09 PROCEDURE — 99214 OFFICE O/P EST MOD 30 MIN: CPT | Mod: 25

## 2025-06-09 ASSESSMENT — REFRACTION_CURRENTRX
OS_OVR_VA: 20/
OD_CYLINDER: -1.50
OS_AXIS: 079
OD_VPRISM_DIRECTION: PROGS
OS_CYLINDER: -1.75
OD_AXIS: 099
OS_ADD: +2.50
OD_ADD: +2.50
OS_VPRISM_DIRECTION: PROGS
OS_SPHERE: +2.50
OD_SPHERE: +2.75
OD_OVR_VA: 20/

## 2025-06-09 ASSESSMENT — REFRACTION_AUTOREFRACTION
OD_CYLINDER: -1.75
OS_SPHERE: +2.25
OD_SPHERE: +1.75
OS_CYLINDER: -2.50
OD_AXIS: 094
OS_AXIS: 101

## 2025-06-09 ASSESSMENT — KERATOMETRY
OD_AXISANGLE_DEGREES: 176
OS_K1POWER_DIOPTERS: 42.00
OS_K2POWER_DIOPTERS: 44.00
OD_K2POWER_DIOPTERS: 43.75
OD_K1POWER_DIOPTERS: 42.50
OS_AXISANGLE_DEGREES: 011

## 2025-06-09 ASSESSMENT — TONOMETRY
OD_IOP_MMHG: 14
OD_IOP_MMHG: 17
OS_IOP_MMHG: 15
OS_IOP_MMHG: 16

## 2025-06-09 ASSESSMENT — CONFRONTATIONAL VISUAL FIELD TEST (CVF)
OS_FINDINGS: FULL
OD_FINDINGS: FULL

## 2025-06-09 ASSESSMENT — VISUAL ACUITY
OS_BCVA: 20/40+1
OD_BCVA: 20/30-1

## 2025-06-09 NOTE — END OF VISIT
[Time Spent: ___ minutes] : I have spent [unfilled] minutes of time on the encounter which excludes teaching and separately reported services. Detail Level: Simple Detail Level: Zone

## 2025-06-10 NOTE — HISTORY OF PRESENT ILLNESS
[de-identified] : The patient is a 82 year old [LEFT] hand dominant male who presents today complaining of left knee.   Date of Injury/Onset: 8/2022  Pain:    At Rest: 3/10  With Activity:  6/10  Mechanism of injury: insidious  This is NOT a Work Related Injury being treated under Worker's Compensation. This is NOT an athletic injury occurring associated with an interscholastic or organized sports team. Quality of symptoms: aching  Improves with: CSI, stretching  Worse with: activity, bending  Treatment/Imaging/Studies Since Last Visit: CSI - 4/24/24 	Reports Available For Review Today: none Out of work/sport: retired School/Sport/Position/Occupation: retired   Change since last visit: here for first Durolane injection. Patient reports last CSI injection was not helpful  Additional Information:  Patient reports last CSI injection was not helpful and is here for Durolane injection.

## 2025-06-10 NOTE — PHYSICAL EXAM
[de-identified] : Neurovascularly intact distally   Left Knee:  Lateral joint line tenderness Lateral facet of patella tenderness Lateral Abiel's test positive

## 2025-06-10 NOTE — HISTORY OF PRESENT ILLNESS
[de-identified] : The patient is a 82 year old [LEFT] hand dominant male who presents today complaining of left knee.   Date of Injury/Onset: 8/2022  Pain:    At Rest: 3/10  With Activity:  6/10  Mechanism of injury: insidious  This is NOT a Work Related Injury being treated under Worker's Compensation. This is NOT an athletic injury occurring associated with an interscholastic or organized sports team. Quality of symptoms: aching  Improves with: CSI, stretching  Worse with: activity, bending  Treatment/Imaging/Studies Since Last Visit: CSI - 4/24/24 	Reports Available For Review Today: none Out of work/sport: retired School/Sport/Position/Occupation: retired   Change since last visit: here for first Durolane injection. Patient reports last CSI injection was not helpful  Additional Information:  Patient reports last CSI injection was not helpful and is here for Durolane injection.

## 2025-06-10 NOTE — DISCUSSION/SUMMARY
[de-identified] : Patient is here for left knee Durolane injection. Discussed treatment options, the patient would like to follow through with left knee TUNDE injection. Patient will follow up as needed.     RB&A to hyaluronic acid injection discussed. All questions were answered. Patient wishes to move forward with injection today.     LEFT KNEE DUROLANE ONE INJECTION - ULTRASOUND GUIDED Patient Identification Name/: Verbal with patient and/or family Procedure Verification: Procedure confirmed with patient or family/designee Consent for procedure: Verbal Consent Given Relevant documentation completed, reviewed, and signed Clinical indications for procedure confirmed Time-out with all members of procedure team immediately prior to procedure: Correct patient identified. Agreement on procedure. Correct side and site.   KNEE INTRAARTICULAR INJECTION - LEFT After verbal consent and identification of the correct patient and correct site, the LEFT knee was prepped using alcohol swabs and betadine. This was allowed time to air dry. The pre-loaded DUROLANE 60mg/3ml was injected into the LEFT knee via the lateral knee portal with a 1-inch 22G needle. Ultrasound was used to ensure proper needle placement. The patient tolerated the procedure well. A sterile dressing was placed. After-care instructions were provided and included instructions to ice the area and to call if redness, pain, or fever develop.     ----------------------------------------------- Home Exercise The patient is instructed on a home exercise program.   CESAR ECHAVARRIA Acting as a Scribe for Dr. Priscilla WATKINS, Cesar Echavarria, attest that this documentation has been prepared under the direction and in the presence of Provider Dr. Wells.   Activity Modification The patient was advised to modify their activities.   Dx / Natural History The patient was advised of the diagnosis. The natural history of the pathology was explained in full to the patient in layman's terms. Several different treatment options were discussed and explained in full to the patient including the risks and benefits of both surgical and non-surgical treatments.  All questions and concerns were answered.   Pain Guide Activities The patient was advised to let pain guide the gradual advancement of activities.   TIFFANIE WATKINS explained to the patient that rest, ice, compression, and elevation would benefit them. They may return to activity after follow-up or when they no longer have any pain.   The patient's current medication management of their orthopedic diagnosis was reviewed today: (1) We discussed a comprehensive treatment plan that included possible pharmaceutical management involving the use of prescription strength medications including but not limited to options such as oral Naprosyn 500mg BID, once daily Meloxicam 15 mg, or 500-650 mg Tylenol versus over the counter oral medications and topical prescription NSAID Pennsaid vs over the counter Voltaren gel. (2) There is a moderate risk of morbidity with further treatment, especially from use of prescription strength medications and possible side effects of these medications which include upset stomach with oral medications, skin reactions to topical medications and cardiac/renal issues with long term use. (3) I recommended that the patient follow-up with their medical physician to discuss any significant specific potential issues with long term medication use such as interactions with current medications or with exacerbation of underlying medical comorbidities. (4) The benefits and risks associated with use of injectable, oral or topical, prescription and over the counter anti-inflammatory medications were discussed with the patient. The patient voiced understanding of the risks including but not limited to bleeding, stroke, kidney dysfunction, heart disease, and were referred to the black box warning label for further information.

## 2025-06-10 NOTE — DISCUSSION/SUMMARY
[de-identified] : Patient is here for left knee Durolane injection. Discussed treatment options, the patient would like to follow through with left knee TUNDE injection. Patient will follow up as needed.     RB&A to hyaluronic acid injection discussed. All questions were answered. Patient wishes to move forward with injection today.     LEFT KNEE DUROLANE ONE INJECTION - ULTRASOUND GUIDED Patient Identification Name/: Verbal with patient and/or family Procedure Verification: Procedure confirmed with patient or family/designee Consent for procedure: Verbal Consent Given Relevant documentation completed, reviewed, and signed Clinical indications for procedure confirmed Time-out with all members of procedure team immediately prior to procedure: Correct patient identified. Agreement on procedure. Correct side and site.   KNEE INTRAARTICULAR INJECTION - LEFT After verbal consent and identification of the correct patient and correct site, the LEFT knee was prepped using alcohol swabs and betadine. This was allowed time to air dry. The pre-loaded DUROLANE 60mg/3ml was injected into the LEFT knee via the lateral knee portal with a 1-inch 22G needle. Ultrasound was used to ensure proper needle placement. The patient tolerated the procedure well. A sterile dressing was placed. After-care instructions were provided and included instructions to ice the area and to call if redness, pain, or fever develop.     ----------------------------------------------- Home Exercise The patient is instructed on a home exercise program.   CESAR ECHAVARRIA Acting as a Scribe for Dr. Priscilla WATKINS, Cesar Echavarria, attest that this documentation has been prepared under the direction and in the presence of Provider Dr. Wells.   Activity Modification The patient was advised to modify their activities.   Dx / Natural History The patient was advised of the diagnosis. The natural history of the pathology was explained in full to the patient in layman's terms. Several different treatment options were discussed and explained in full to the patient including the risks and benefits of both surgical and non-surgical treatments.  All questions and concerns were answered.   Pain Guide Activities The patient was advised to let pain guide the gradual advancement of activities.   TIFFANIE WATKINS explained to the patient that rest, ice, compression, and elevation would benefit them. They may return to activity after follow-up or when they no longer have any pain.   The patient's current medication management of their orthopedic diagnosis was reviewed today: (1) We discussed a comprehensive treatment plan that included possible pharmaceutical management involving the use of prescription strength medications including but not limited to options such as oral Naprosyn 500mg BID, once daily Meloxicam 15 mg, or 500-650 mg Tylenol versus over the counter oral medications and topical prescription NSAID Pennsaid vs over the counter Voltaren gel. (2) There is a moderate risk of morbidity with further treatment, especially from use of prescription strength medications and possible side effects of these medications which include upset stomach with oral medications, skin reactions to topical medications and cardiac/renal issues with long term use. (3) I recommended that the patient follow-up with their medical physician to discuss any significant specific potential issues with long term medication use such as interactions with current medications or with exacerbation of underlying medical comorbidities. (4) The benefits and risks associated with use of injectable, oral or topical, prescription and over the counter anti-inflammatory medications were discussed with the patient. The patient voiced understanding of the risks including but not limited to bleeding, stroke, kidney dysfunction, heart disease, and were referred to the black box warning label for further information.

## 2025-06-10 NOTE — PHYSICAL EXAM
[de-identified] : Neurovascularly intact distally   Left Knee:  Lateral joint line tenderness Lateral facet of patella tenderness Lateral Abiel's test positive

## 2025-06-10 NOTE — ADDENDUM
[FreeTextEntry1] : Documented by Phil Laura acting as a scribe for Dr. Wells and Juan Rodriges PA-C on 06/09/2025 and was presence for the following sections: Physical Exam; Data Reviewed; Assessment; Discussion/Summary. All medical record entries made by the Scribe were at my, Dr. Wells, and Juan Rodriges, direction and personally dictated by me on 06/09/2025. I have reviewed the chart and agree that the record accurately reflects my personal performance of the history, physical exam, procedure and imaging.

## 2025-06-12 NOTE — ED ADULT TRIAGE NOTE - SOURCE OF INFORMATION
Patient needs updated blood work and has previously placed orders. Please contact patient to go for labs. Courtesy refill provided.    
Patient

## 2025-07-23 ENCOUNTER — APPOINTMENT (OUTPATIENT)
Dept: ORTHOPEDIC SURGERY | Facility: CLINIC | Age: 85
End: 2025-07-23
Payer: MEDICARE

## 2025-07-23 VITALS — HEIGHT: 66 IN | BODY MASS INDEX: 28.93 KG/M2 | WEIGHT: 180 LBS

## 2025-07-23 DIAGNOSIS — M54.16 RADICULOPATHY, LUMBAR REGION: ICD-10-CM

## 2025-07-23 DIAGNOSIS — G89.29 LOW BACK PAIN, UNSPECIFIED: ICD-10-CM

## 2025-07-23 DIAGNOSIS — M51.369: ICD-10-CM

## 2025-07-23 DIAGNOSIS — M54.50 LOW BACK PAIN, UNSPECIFIED: ICD-10-CM

## 2025-07-23 PROCEDURE — 72100 X-RAY EXAM L-S SPINE 2/3 VWS: CPT

## 2025-07-23 PROCEDURE — 99213 OFFICE O/P EST LOW 20 MIN: CPT

## 2025-07-23 NOTE — DATA REVIEWED
[FreeTextEntry1] : On my interpretation of these images in office x-rays Lumbar spine ap/lat 07/23/2025 2 view compared to 2023 lumbar x-ray  Extensive spondylosis with bridging osteophyte from L2-3. large anterior osteophyte L3/4. a facet disc Degen from L4-S1 not particularly progressed to 2023 XR. No listhesis

## 2025-07-23 NOTE — HISTORY OF PRESENT ILLNESS
[5] : 5 [3] : 3 [Rest] : rest [Heat] : heat [Retired] : Work status: retired [de-identified] : 7/23/25: This is a 83 y/o male reporting of lumbar back pain. Patient reports that he has had discomfort in the past but after playing golf recently approximately one week ago his symptoms were exacerbated. Has been applying heat to ease symptoms. Patient report snot pain radiating down his legs, most prominent in his feet. He reports underlying diagnosis of neuropathy. The patient also claims that he has been seeing a rheumatologist. He reports 50% improvement in his symptoms after he injured his back one week ago.  [] : no [FreeTextEntry9] : tylenol [de-identified] : twisting  [de-identified] : orthopedic [de-identified] : mri l-spine done at  04/2022, xr l-spine done at [facility] 08/28/23

## 2025-07-23 NOTE — DISCUSSION/SUMMARY
[de-identified] : 84 Y M w/ acute exacerbation of chronic low back pain, lumbar disc degeneration, lumbar radiculopathy   Discussed conservative treatment options for the patient's lumbar back pain. Emphasized that the patient should begin to attend physical therapy before we consider receiving a new MRI. Explained to patient that his complaints of radicular leg pain could be due to compression of the L5 nerve and the patient's stenosis. Discussed the option to receive an injection from a pain management doctor, however, explained that if the patient is experiencing numbness an injection will not resolve the patient's symptoms. If the patient is still experiencing ongoing pain after attending physical therapy residually, follow up in 6 weeks.   Follow up 6 weeks  Prior to appointment and during encounter with patient extensive medical records were reviewed including but not limited to, hospital records, out patient records, imaging results, and lab data. During this appointment the patient was examined, diagnoses were discussed and explained in a face to face manner. In addition extensive time was spent reviewing aforementioned diagnostic studies. Counseling including abnormal image results, differential diagnoses, treatment options, risk and benefits, lifestyle changes, current condition, and current medications was performed. Patient's comments, questions, and concerns were address and patient verbalized understanding. Based on this patient's presentation at our office, which is an orthopedic spine surgeon's office, this patient inherently / intrinsically has a risk, however minute, of developing issues such as Cauda equina syndrome, bowel and bladder changes, or progression of motor or neurological deficits such as paralysis which may be permanent.   [facility] attest that this documentation has been prepared under the direction and in the presence of Provider Dr. Hubert Putnam MD.

## 2025-07-23 NOTE — PHYSICAL EXAM
[5___] : right extensor hallicus longus 5[unfilled]/5 [de-identified] : Constitutional: - General Appearance: Unremarkable Body Habitus Well Developed Well Nourished Body Habitus No Deformities Well Groomed Ability To communicate: Normal Neurologic: Global sensation is intact to upper and lower extremities. See examination of Neck and/or Spine for exceptions. Orientation to Time, Place and Person is: Normal Mood And Affect is Normal Skin: - Head/Face, Right Upper/Lower Extremity, Left Upper/Lower Extremity: Normal See Examination of Neck and/or Spine for exceptions Cardiovascular: Peripheral Cardiovascular System is Normal Palpation of Lymph Nodes: Normal Palpation of lymph nodes in: Axilla, Cervical, Inguinal Abnormal Palpation of lymph nodes in: None  [] : non-antalgic [FreeTextEntry9] : 50% reduction with lateral bending to the right.45 degrees flexion. Stiffness in all planes  [de-identified] : Strength intact [de-identified] : paraetsesia bilateral lower extremedities L5 distribution

## 2025-08-12 ENCOUNTER — APPOINTMENT (OUTPATIENT)
Dept: NEUROLOGY | Facility: CLINIC | Age: 85
End: 2025-08-12
Payer: MEDICARE

## 2025-08-12 VITALS
HEIGHT: 66 IN | HEART RATE: 55 BPM | WEIGHT: 180 LBS | BODY MASS INDEX: 28.93 KG/M2 | SYSTOLIC BLOOD PRESSURE: 180 MMHG | DIASTOLIC BLOOD PRESSURE: 75 MMHG | TEMPERATURE: 98.2 F

## 2025-08-12 DIAGNOSIS — G60.9 HEREDITARY AND IDIOPATHIC NEUROPATHY, UNSPECIFIED: ICD-10-CM

## 2025-08-12 PROCEDURE — 99213 OFFICE O/P EST LOW 20 MIN: CPT

## 2025-08-12 PROCEDURE — G2211 COMPLEX E/M VISIT ADD ON: CPT

## 2025-08-12 RX ORDER — OLMESARTAN MEDOXOMIL 40 MG/1
40 TABLET, FILM COATED ORAL
Refills: 0 | Status: ACTIVE | COMMUNITY

## 2025-08-12 RX ORDER — HYDRALAZINE HYDROCHLORIDE 50 MG/1
50 TABLET ORAL
Refills: 0 | Status: ACTIVE | COMMUNITY

## 2025-08-12 RX ORDER — ATORVASTATIN CALCIUM 10 MG/1
10 TABLET, FILM COATED ORAL
Refills: 0 | Status: ACTIVE | COMMUNITY

## 2025-08-12 RX ORDER — DULOXETINE 30 MG/1
30 CAPSULE, DELAYED RELEASE ORAL
Qty: 51 | Refills: 1 | Status: ACTIVE | COMMUNITY
Start: 2025-08-12 | End: 1900-01-01

## 2025-09-05 ENCOUNTER — APPOINTMENT (OUTPATIENT)
Dept: ORTHOPEDIC SURGERY | Facility: CLINIC | Age: 85
End: 2025-09-05
Payer: MEDICARE

## 2025-09-05 VITALS — BODY MASS INDEX: 28.93 KG/M2 | HEIGHT: 66 IN | WEIGHT: 180 LBS

## 2025-09-05 DIAGNOSIS — R29.818 OTHER SYMPTOMS AND SIGNS INVOLVING THE NERVOUS SYSTEM: ICD-10-CM

## 2025-09-05 DIAGNOSIS — M54.16 RADICULOPATHY, LUMBAR REGION: ICD-10-CM

## 2025-09-05 PROCEDURE — 99214 OFFICE O/P EST MOD 30 MIN: CPT

## 2025-09-09 ENCOUNTER — APPOINTMENT (OUTPATIENT)
Dept: MRI IMAGING | Facility: CLINIC | Age: 85
End: 2025-09-09
Payer: MEDICARE

## 2025-09-09 PROCEDURE — 72148 MRI LUMBAR SPINE W/O DYE: CPT

## 2025-09-19 ENCOUNTER — APPOINTMENT (OUTPATIENT)
Dept: ORTHOPEDIC SURGERY | Facility: CLINIC | Age: 85
End: 2025-09-19
Payer: MEDICARE

## 2025-09-19 DIAGNOSIS — M48.061 SPINAL STENOSIS, LUMBAR REGION WITHOUT NEUROGENIC CLAUDICATION: ICD-10-CM

## 2025-09-19 DIAGNOSIS — I77.819 AORTIC ECTASIA, UNSPECIFIED SITE: ICD-10-CM

## 2025-09-19 PROCEDURE — 99213 OFFICE O/P EST LOW 20 MIN: CPT
